# Patient Record
Sex: FEMALE | Race: WHITE | URBAN - METROPOLITAN AREA
[De-identification: names, ages, dates, MRNs, and addresses within clinical notes are randomized per-mention and may not be internally consistent; named-entity substitution may affect disease eponyms.]

---

## 2018-09-14 ENCOUNTER — HOSPITAL ENCOUNTER (OUTPATIENT)
Age: 28
Discharge: HOME | End: 2018-09-16
Payer: COMMERCIAL

## 2018-09-14 PROCEDURE — G0123 SCREEN CERV/VAG THIN LAYER: HCPCS

## 2019-11-26 RX ORDER — LEVONORGESTREL / ETHINYL ESTRADIOL AND ETHINYL ESTRADIOL 150-30(84)
KIT ORAL DAILY
COMMUNITY
End: 2022-07-14

## 2022-07-14 ENCOUNTER — OFFICE VISIT (OUTPATIENT)
Dept: BARIATRICS/WEIGHT MGMT | Age: 32
End: 2022-07-14
Payer: COMMERCIAL

## 2022-07-14 VITALS
HEART RATE: 77 BPM | DIASTOLIC BLOOD PRESSURE: 80 MMHG | SYSTOLIC BLOOD PRESSURE: 121 MMHG | TEMPERATURE: 98.2 F | HEIGHT: 67 IN | WEIGHT: 293 LBS | BODY MASS INDEX: 45.99 KG/M2

## 2022-07-14 DIAGNOSIS — M25.561 CHRONIC PAIN OF BOTH KNEES: Primary | ICD-10-CM

## 2022-07-14 DIAGNOSIS — G89.29 CHRONIC PAIN OF BOTH KNEES: Primary | ICD-10-CM

## 2022-07-14 DIAGNOSIS — E66.01 CLASS 3 SEVERE OBESITY DUE TO EXCESS CALORIES WITHOUT SERIOUS COMORBIDITY WITH BODY MASS INDEX (BMI) OF 45.0 TO 49.9 IN ADULT (HCC): ICD-10-CM

## 2022-07-14 DIAGNOSIS — M25.562 CHRONIC PAIN OF BOTH KNEES: Primary | ICD-10-CM

## 2022-07-14 PROCEDURE — G2212 PROLONG OUTPT/OFFICE VIS: HCPCS | Performed by: INTERNAL MEDICINE

## 2022-07-14 PROCEDURE — 1036F TOBACCO NON-USER: CPT | Performed by: INTERNAL MEDICINE

## 2022-07-14 PROCEDURE — 99202 OFFICE O/P NEW SF 15 MIN: CPT

## 2022-07-14 PROCEDURE — G8428 CUR MEDS NOT DOCUMENT: HCPCS | Performed by: INTERNAL MEDICINE

## 2022-07-14 PROCEDURE — 99205 OFFICE O/P NEW HI 60 MIN: CPT | Performed by: INTERNAL MEDICINE

## 2022-07-14 PROCEDURE — G8417 CALC BMI ABV UP PARAM F/U: HCPCS | Performed by: INTERNAL MEDICINE

## 2022-07-14 RX ORDER — ACETAMINOPHEN 160 MG
1 TABLET,DISINTEGRATING ORAL DAILY
COMMUNITY

## 2022-07-14 NOTE — PROGRESS NOTES
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  Lowest   wt 215 lbs  Highest  wt 340 lbs  Pattern of wt gain: grad  Wt change past yr: - 26 lbs  Most wt lost: 45 lbs [facility], [facility])  Other diets attempted: none    Desire to lose weight: 10/10  Problem posed by appetite: 5-6/10    Initial Diet:    Number of meals per day - 3    Number of snacks per day - 0-1    Meal volume - 12\" plate, sometimes seconds    Fast food/convenience store - 1-2x/week    Restaurants (not fast food) - 0-1x/week   Sweets - 1-2d/week (1 reg size candy bar or 2 cups of Halo top ice cream 290 osmani)   Chips - 0d/week   Crackers/pretzels - 1d/week (one to two handfuls of Cheezits)   Nuts - 2-3d/week (one handful)   Peanut Butter - 0d/week   Popcorn - 0-1d/week (100 osmani MW bag)   Dried fruit - 0d/week   Whole fruit - 3d/week (one piece)   Breakfast cereal - 0d/week   Granola/Protein/Energy bar - 0-2d/week (Aldi Endurance low carb bar or a Kind bar)   Sugar sweetened beverages - 1-2 cups of coffee with 2 Tablespoons of Coffee mate creamer/day   Protein - No supplements   Fiber - No supplements     Exercise:    Gym membership - [employer]    Walking - 30 min, 4x/wk    Running - none    Resistance - none    Aerobic class - none    ______________________    [facility] -  Past Medical History:   Diagnosis Date    Chronic pain of both knees     Class 3 severe obesity due to excess calories without serious comorbidity with body mass index (BMI) of 45.0 to 49.9 in adult [facility])     Infertility, female      Current Outpatient Medications   Medication Sig Dispense Refill    metFORMIN (GLUCOPHAGE) 500 MG tablet Take 500 mg by mouth 3 times daily      Cholecalciferol (VITAMIN D3) 50 MCG (2000 UT) CAPS Take 1 capsule by mouth daily      Prenatal Vit-Fe Fumarate-FA (PRENATAL VITAMINS PO) Take 1 tablet by mouth daily       No current facility-administered medications for this visit. ROS -  Card - no CP  GI - no N/V/D/C    PE -  Gen : /80 (Site: Left Upper Arm, Position: Sitting, Cuff Size: Thigh)   Pulse 77   Temp 98.2 °F (36.8 °C) (Temporal)   Ht 5' 6.5\" (1.689 m)   Wt (!) 313 lb 12.8 oz (142.3 kg)   LMP 07/01/2022 (Exact Date)   BMI 49.89 kg/m²    WN, WD, NAD  Heart:  RRR w/o MGR, no Carotid bruits  Lung: Nml resp effort, CTA b/l  Psych: Normal mood   Full affect  Neuro: Moves all ext well  ______________________    HISTORY & ASSESSMENT/PLAN -     Problem 1 - Knee pain   HPI -  Began in late 20's   No injury   Tylenol makes it better   Physical activity and excess weight worsens it   Severity this past week: 5/10   Assessment - Uncontrolled; excess wt is increasing the mechanical load on the joint and unfavorably altering the biomechanics of posture and gain   Plan -   Wt reduction per the plan below    Problem 2 - Obesity   HPI -  See above Background for description  Weight  Date   313.8 lbs 07/15/22  DEN = 2,270 osmani/d = 15,890 osmani/wk  Wt effect of HR foods = Restaurant food 300 osmani/wk + Sweets 375 + Nuts 310 + Endurance bar 240 + Coffee 315 = 1540 osmani/wk = 220 osmani/d = 10% DEN = 22 lbs/yr  Pt is preparing for IVF (male partner infertility). She must reduce her weight prior to the procedure. Her goal is 265 lbs. She does not want surgery and does not feel that she can tolerate a VLCD  Instead, she prefers a counting-based regimen (has had success with WW in the past) and is agreeable with targeted elimination/restriction. She does not want an appetite suppressant b/c of fearing the teratogenous effects if she were to unexpectedly conceive. We discussed exercise. She feels she can walk two miles every day. Initial diet plan: calorie-counting with rules of elimination and well-defined restrictions.   Initial appetite suppressant: none   Assessment - Uncontrolled    Plan -   Patient Instructions   Rules:  · Count every calorie every day  · Limit sweets to one day per month  · Limit chips/crackers/pretzels/nuts/popcorn to 100 osmani/day  · Eliminate all sugar sweetened beverages (including fruit juice) (keep total creamer for all coffee in a day to <40 calories)  · Limit restaurants (including fast food and food from a convenience store) to one time every two weeks while in town    Requirements:  · Make sure protein intake is at least 65 grams per day (do not count protein every day; instead spot check your intake every 2-3 weeks and make sure what you think you are getting is close to accurate; consider using a protein shake if needed; these are in the pharmacy section of the stores, not the grocery section; Premier, Pure Protein and Fairlife are relatively inexpensive and taste good to most patients; other options are Nectar, Boost Max, Ensure Max, BeneProtein and GNC lean (which is lactose-free); Nectar fruit, Premier Protein Clear, IsoPure Protein Drink, and Protein 2 O are water-based options; Quest (or Cosco, which is cheaper and is ordered on [address] [address]) and the Oh [employer] 1 protein bars can also be used, but have less protein in them )  (Disclaimer: Dietary supplements rarely have their listed ingredients and the amount of each verified by a third party other. Sometimes they give verification for their claims to be GMO and gluten free and to be organic. However, even such verifications as these may still be untrustworthy.)  · Make sure that fiber intake is at least 22 grams per day. Do this by either eating 12 tablespoons of the original, plain Fiber One cereal every day or 4 tablespoons of wheat dextrin powder (Benefiber or a generic brand) every day. Work up to this amount slowly by starting with only one-eighth to one-fourth of the target amount and then adding another one-eighth to one-fourth every one or two weeks until reaching the target.   · Take one multivitamin every day  · Drink at least 64 oz of water daily  · Sleep 7 hours each night    Targets:  · Limit calorie intake to 1500 calories/day  · Walk 40 minutes daily  · Avoid eating 2 hours within bedtime. Tips:  · Do not eat outside of the dining room or the kitchen  · Do not eat while watching TV, videos, working on the computer or using a smart phone  · Do not eat food out of a multi-serving bag or container. · Establish 6 hours of food-free \"time-out\" periods (times you don't eat) each day. No period can be less than 1 hour long. The periods need to be the same every day for days that are the same (for example, workdays would have one set of food free periods and weekends would have another set of days). These six hours are in addition to the two hours before bedtime and the time spent sleeping.     Return to see me in 4-6 weeks    Total time spent on encounter: 90 min    Rolo Avitia MD  Endocrinology/Obesity  7/14/22

## 2022-08-31 ENCOUNTER — OFFICE VISIT (OUTPATIENT)
Dept: BARIATRICS/WEIGHT MGMT | Age: 32
End: 2022-08-31
Payer: COMMERCIAL

## 2022-08-31 VITALS
DIASTOLIC BLOOD PRESSURE: 63 MMHG | SYSTOLIC BLOOD PRESSURE: 110 MMHG | TEMPERATURE: 98.1 F | BODY MASS INDEX: 45.99 KG/M2 | HEART RATE: 63 BPM | HEIGHT: 67 IN | WEIGHT: 293 LBS

## 2022-08-31 DIAGNOSIS — M25.562 CHRONIC PAIN OF BOTH KNEES: Primary | ICD-10-CM

## 2022-08-31 DIAGNOSIS — M25.561 CHRONIC PAIN OF BOTH KNEES: Primary | ICD-10-CM

## 2022-08-31 DIAGNOSIS — E66.01 CLASS 3 SEVERE OBESITY DUE TO EXCESS CALORIES WITHOUT SERIOUS COMORBIDITY WITH BODY MASS INDEX (BMI) OF 45.0 TO 49.9 IN ADULT (HCC): ICD-10-CM

## 2022-08-31 DIAGNOSIS — G89.29 CHRONIC PAIN OF BOTH KNEES: Primary | ICD-10-CM

## 2022-08-31 PROCEDURE — 99213 OFFICE O/P EST LOW 20 MIN: CPT | Performed by: INTERNAL MEDICINE

## 2022-08-31 PROCEDURE — G8417 CALC BMI ABV UP PARAM F/U: HCPCS | Performed by: INTERNAL MEDICINE

## 2022-08-31 PROCEDURE — 1036F TOBACCO NON-USER: CPT | Performed by: INTERNAL MEDICINE

## 2022-08-31 PROCEDURE — 99211 OFF/OP EST MAY X REQ PHY/QHP: CPT

## 2022-08-31 PROCEDURE — G8428 CUR MEDS NOT DOCUMENT: HCPCS | Performed by: INTERNAL MEDICINE

## 2022-08-31 NOTE — PATIENT INSTRUCTIONS
Rules:  Count every calorie every day  Limit sweets to one day per month  Limit chips/crackers/pretzels/nuts/popcorn to 100 osmani/day  Eliminate all sugar sweetened beverages (including fruit juice) (keep total creamer for all coffee in a day to <40 calories)  Limit restaurants (including fast food and food from a convenience store) to one time every two weeks while in town    Requirements:  Make sure protein intake is at least 65 grams per day (do not count protein every day; instead spot check your intake every 2-3 weeks and make sure what you think you are getting is close to accurate; consider using a protein shake if needed; these are in the pharmacy section of the stores, not the grocery section; Premier, Pure Protein and Fairlife are relatively inexpensive and taste good to most patients; other options are Nectar, Boost Max, Ensure Max, BeneProtein and GNC lean (which is lactose-free); Nectar fruit, Premier Protein Clear, IsoPure Protein Drink, and Protein 2 O are water-based options; Quest (or Cosco, which is cheaper and is ordered on [employer]) and the [employer] protein bars can also be used, but have less protein in them )  (Disclaimer: Dietary supplements rarely have their listed ingredients and the amount of each verified by a third party other. Sometimes they give verification for their claims to be GMO and gluten free and to be organic. However, even such verifications as these may still be untrustworthy.)  Make sure that fiber intake is at least 22 grams per day. Do this by either eating 12 tablespoons of the original, plain Fiber One cereal every day or 4 tablespoons of wheat dextrin powder (Benefiber or a generic brand) every day. Work up to this amount slowly by starting with only one-eighth to one-fourth of the target amount and then adding another one-eighth to one-fourth every one or two weeks until reaching the target.   Take one multivitamin every day  Drink at least 64 oz of water daily  Sleep 7 hours each night    Targets:  Limit calorie intake to 1500 calories/day  Walk 40 minutes daily  Avoid eating 2 hours within bedtime. Tips:  Do not eat outside of the dining room or the kitchen  Do not eat while watching TV, videos, working on the computer or using a smart phone  Do not eat food out of a multi-serving bag or container. Establish 6 hours of food-free \"time-out\" periods (times you don't eat) each day. No period can be less than 1 hour long. The periods need to be the same every day for days that are the same (for example, workdays would have one set of food free periods and weekends would have another set of days). These six hours are in addition to the two hours before bedtime and the time spent sleeping.     Return to see me in 4-6 weeks

## 2022-08-31 NOTE — PROGRESS NOTES
CC -   Chronic knee pain, Obesity    BACKGROUND -   Last visit: 07/14/22  First visit: 07/14/22    Obesity   Began in teens  Initial BMI 49.9, Wt 313.8 lbs, Ht 5' 6.5\"  HS Grad wt 230 lbs  Lowest   wt 215 lbs  Highest  wt 340 lbs  Pattern of wt gain: grad  Wt change past yr: - 26 lbs  Most wt lost: 45 lbs [facility])  Other diets attempted: none    Desire to lose weight: 10/10  Problem posed by appetite: 5-6/10    Initial Diet:    Number of meals per day - 3    Number of snacks per day - 0-1    Meal volume - 12\" plate, sometimes seconds    Fast food/convenience store - 1-2x/week    Restaurants (not fast food) - 0-1x/week   Sweets - 1-2d/week (1 reg size candy bar or 2 cups of Halo top ice cream 290 osmani)   Chips - 0d/week   Crackers/pretzels - 1d/week (one to two handfuls of Cheezits)   Nuts - 2-3d/week (one handful)   Peanut Butter - 0d/week   Popcorn - 0-1d/week (100 osmani MW bag)   Dried fruit - 0d/week   Whole fruit - 3d/week (one piece)   Breakfast cereal - 0d/week   Granola/Protein/Energy bar - 0-2d/week (Aldi Endurance low carb bar or a Kind bar)   Sugar sweetened beverages - 1-2 cups of coffee with 2 Tablespoons of Coffee mate creamer/day   Protein - No supplements   Fiber - No supplements     Exercise:    Gym membership - [employer]    Walking - 30 min, 4x/wk    Running - none    Resistance - none    Aerobic class - none    ______________________    [facility] -  Past Medical History:   Diagnosis Date    Chronic pain of both knees     Class 3 severe obesity due to excess calories without serious comorbidity with body mass index (BMI) of 45.0 to 49.9 in adult [facility])     Infertility, female      Current Outpatient Medications   Medication Sig Dispense Refill    metFORMIN (GLUCOPHAGE) 500 MG tablet Take 500 mg by mouth 3 times daily      Cholecalciferol (VITAMIN D3) 50 MCG (2000 UT) CAPS Take 1 capsule by mouth daily      Prenatal Vit-Fe Fumarate-FA (PRENATAL VITAMINS PO) Take 1 tablet by mouth daily       No current facility-administered medications for this visit. ROS -  Card - no CP  GI - no N/V/D/C    PE -  Gen : /63 (Site: Left Upper Arm, Position: Sitting, Cuff Size: Thigh)   Pulse 63   Temp 98.1 °F (36.7 °C) (Temporal)   Ht 5' 6.5\" (1.689 m)   Wt 299 lb 12.8 oz (136 kg)   LMP 08/17/2022 (Exact Date)   BMI 47.66 kg/m²    WN, WD, NAD  Lung: Nml resp effort  Psych: Normal mood   Full affect  Neuro: Moves all ext well  ______________________    HISTORY & ASSESSMENT/PLAN -     Problem 1 - Knee pain   HPI -  Began in late 20's   B/L, R>L   No injury   Tylenol makes it better   Physical activity and excess weight worsens it   Severity this past week: 5-6/10 (slightly worse b/c of increased activity)  Assessment - Uncontrolled; excess wt is increasing the mechanical load on the joint and unfavorably altering the biomechanics of posture and gain  Plan -   Wt reduction per the plan below    Problem 2 - Obesity   HPI -  See above Background for description  Weight  Date   313.8 lbs 07/15/22   299.8 lbs 08/31/22  Total wt loss to date: - 14.0 lbs  DEN = 2,270 osmani/d = 15,890 osmani/wk  Avg daily energy variance:   07/15/22-08/31/22 = - 12.4 lbs/56d = - 775 osmani/d deficit   Wt effect of HR foods = Restaurant food 300 osmain/wk + Sweets 375 + Nuts 310 + Endurance bar 240 + Coffee 315 = 1540 osmain/wk = 220 osmani/d = 10% DEN = 22 lbs/yr  Pt is preparing for IVF (male partner infertility). She must reduce her weight prior to the procedure. Her goal is 265 lbs. She does not want surgery and does not feel that she can tolerate a VLCD  Instead, she prefers a counting-based regimen (has had success with WW in the past) and is agreeable with targeted elimination/restriction. She does not want an appetite suppressant b/c of fearing the teratogenous effects if she were to unexpectedly conceive. We discussed exercise. She feels she can walk two miles every day.   Initial diet plan: calorie-counting with rules of elimination and well-defined restrictions. Initial appetite suppressant: none                                  Update:  Calorie monitoring: counting 4-5d/wk, usual intake is 2600-9126 osmani/d  Sweets: 2d/mo (two fun-size candy bars each day)  Salty snack - 1d/wk, 100 osmani  CCB - none, coffee creamer <40 calories  Restaurant food - 1x/2wk  Assessment - Improved; cont with same plan  Plan -   Patient Instructions   Rules:  Count every calorie every day  Limit sweets to one day per month  Limit chips/crackers/pretzels/nuts/popcorn to 100 osmani/day  Eliminate all sugar sweetened beverages (including fruit juice) (keep total creamer for all coffee in a day to <40 calories)  Limit restaurants (including fast food and food from a convenience store) to one time every two weeks while in town    Requirements:  Make sure protein intake is at least 65 grams per day (do not count protein every day; instead spot check your intake every 2-3 weeks and make sure what you think you are getting is close to accurate; consider using a protein shake if needed; these are in the pharmacy section of the stores, not the grocery section; Premier, Pure Protein and Fairlife are relatively inexpensive and taste good to most patients; other options are Nectar, Boost Max, Ensure Max, BeneProtein and GNC lean (which is lactose-free); Nectar fruit, Premier Protein Clear, IsoPure Protein Drink, and Protein 2 O are water-based options; Quest (or Cosco, which is cheaper and is ordered on [employer]) and the Oh [employer] 1 protein bars can also be used, but have less protein in them )  (Disclaimer: Dietary supplements rarely have their listed ingredients and the amount of each verified by a third party other. Sometimes they give verification for their claims to be GMO and gluten free and to be organic. However, even such verifications as these may still be untrustworthy.)  Make sure that fiber intake is at least 22 grams per day.  Do this by either eating 12 tablespoons of the original, plain Fiber One cereal every day or 4 tablespoons of wheat dextrin powder (Benefiber or a generic brand) every day. Work up to this amount slowly by starting with only one-eighth to one-fourth of the target amount and then adding another one-eighth to one-fourth every one or two weeks until reaching the target. Take one multivitamin every day  Drink at least 64 oz of water daily  Sleep 7 hours each night    Targets:  Limit calorie intake to 1500 calories/day  Walk 40 minutes daily  Avoid eating 2 hours within bedtime. Tips:  Do not eat outside of the dining room or the kitchen  Do not eat while watching TV, videos, working on the computer or using a smart phone  Do not eat food out of a multi-serving bag or container. Establish 6 hours of food-free \"time-out\" periods (times you don't eat) each day. No period can be less than 1 hour long. The periods need to be the same every day for days that are the same (for example, workdays would have one set of food free periods and weekends would have another set of days). These six hours are in addition to the two hours before bedtime and the time spent sleeping.     Return to see me in 4-6 weeks      Ramses Coronel MD  Endocrinology/Obesity  8/31/22

## 2022-12-12 ENCOUNTER — TELEPHONE (OUTPATIENT)
Dept: BARIATRICS/WEIGHT MGMT | Age: 32
End: 2022-12-12

## 2024-02-01 ENCOUNTER — INITIAL PRENATAL (OUTPATIENT)
Dept: OBGYN CLINIC | Age: 34
End: 2024-02-01
Payer: COMMERCIAL

## 2024-02-01 ENCOUNTER — ANCILLARY PROCEDURE (OUTPATIENT)
Dept: OBGYN CLINIC | Age: 34
End: 2024-02-01
Payer: COMMERCIAL

## 2024-02-01 VITALS
DIASTOLIC BLOOD PRESSURE: 74 MMHG | HEART RATE: 65 BPM | BODY MASS INDEX: 45.92 KG/M2 | WEIGHT: 288.8 LBS | SYSTOLIC BLOOD PRESSURE: 120 MMHG

## 2024-02-01 DIAGNOSIS — Z3A.17 17 WEEKS GESTATION OF PREGNANCY: Primary | ICD-10-CM

## 2024-02-01 DIAGNOSIS — Z87.42 HISTORY OF INFERTILITY: ICD-10-CM

## 2024-02-01 DIAGNOSIS — O21.9 NAUSEA/VOMITING IN PREGNANCY: ICD-10-CM

## 2024-02-01 DIAGNOSIS — R39.9 UNSPECIFIED SYMPTOMS AND SIGNS INVOLVING THE GENITOURINARY SYSTEM: ICD-10-CM

## 2024-02-01 DIAGNOSIS — O09.812 PREGNANCY RESULTING FROM IN VITRO FERTILIZATION IN SECOND TRIMESTER: ICD-10-CM

## 2024-02-01 LAB
GLUCOSE URINE, POC: NEGATIVE
PROTEIN UA: NEGATIVE

## 2024-02-01 PROCEDURE — 81002 URINALYSIS NONAUTO W/O SCOPE: CPT | Performed by: OBSTETRICS & GYNECOLOGY

## 2024-02-01 PROCEDURE — 76817 TRANSVAGINAL US OBSTETRIC: CPT | Performed by: OBSTETRICS & GYNECOLOGY

## 2024-02-01 PROCEDURE — 99203 OFFICE O/P NEW LOW 30 MIN: CPT | Performed by: OBSTETRICS & GYNECOLOGY

## 2024-02-01 PROCEDURE — 76805 OB US >/= 14 WKS SNGL FETUS: CPT | Performed by: OBSTETRICS & GYNECOLOGY

## 2024-02-01 RX ORDER — ASPIRIN 81 MG/1
81 TABLET ORAL DAILY
Qty: 90 TABLET | Refills: 1 | Status: SHIPPED | OUTPATIENT
Start: 2024-02-01

## 2024-02-01 RX ORDER — ONDANSETRON 4 MG/1
4 TABLET, FILM COATED ORAL 4 TIMES DAILY
COMMUNITY
Start: 2023-12-08

## 2024-02-01 RX ORDER — ESTRADIOL 2 MG/1
2 TABLET ORAL 3 TIMES DAILY
COMMUNITY
Start: 2023-10-30

## 2024-02-01 NOTE — PROGRESS NOTES
Pt here as a new patient.  Pt has some mild cramping. Denies bleeding/lof.    
your office for ongoing obstetric care.    --The total time spent on today's visit was 50 minutes.  This included preparation for the visit (i.e. reviewing prior external notes and test results), performance of a medically appropriate history and examination, counseling, orders for medications, tests or other procedures, and coordination of care.  Greater than 50% of the time was spent face-to-face with the patient.  This time is exclusive of procedures performed.       --At the conclusion of the visit, the patient appeared to have a good understanding of the issues discussed.  I answered all of her questions to her satisfaction. I asked her to call if she had any additional questions prior to her next visit.      --Thank you for allowing me to participate in the care of this pleasant patient.  Please don't hesitate to call me if you have any questions.      Sincerely,        Kindra Baig MD, FACOG    Maternal-Fetal Medicine  [facility]  [phone] option 1   [address] [place] [address], [address] [place] [address]    *All or parts of this note may have been generated using a voice recognition program. There may be typo, grammar, or Word substitution errors that have escaped my review of this note.

## 2024-02-01 NOTE — PATIENT INSTRUCTIONS
Please arrive for your scheduled appointment at least 15 minutes early with your actual insurance card+ a photo ID. Also if you need any refills ordered or have questions, it may take up 48 hours to reply. Please allow ample time for your refills. Call me when you use last refill. Thank you for your cooperation. You might be having an NST at your next appt. Please eat a large snack or breakfast before coming to office. Thank youCall your primary obstetrician with bleeding, leaking of fluid, abdominal tenderness, headache, blurry vision, epigastric pain and increased urinary frequency.If you are experiencing an emergency and need immediate help, call 911 or go to go emergency room or labor and delivery. Do kick counts after dinner. Call your primary obstetrician if less than 10 kicks in 2 hours after dinner.     Call your primary obstetrician with bleeding, leaking of fluid, abdominal tenderness, headache, blurry vision, epigastric pain and increased urinary frequency.Do kick counts after dinner. Call your primary obstetrician if less than 10 kicks in 2 hours after dinner.     Call your primary obstetrician with bleeding, leaking of fluid, abdominal tenderness, headache, blurry vision, epigastric pain and increased urinary frequency.

## 2024-02-09 PROBLEM — Z87.42 HISTORY OF INFERTILITY: Status: ACTIVE | Noted: 2024-02-09

## 2024-02-09 PROBLEM — O09.812 PREGNANCY RESULTING FROM IN VITRO FERTILIZATION IN SECOND TRIMESTER: Status: ACTIVE | Noted: 2024-02-09

## 2024-02-09 PROBLEM — O21.9 NAUSEA/VOMITING IN PREGNANCY: Status: ACTIVE | Noted: 2024-02-09

## 2024-02-09 PROBLEM — R39.9 UNSPECIFIED SYMPTOMS AND SIGNS INVOLVING THE GENITOURINARY SYSTEM: Status: ACTIVE | Noted: 2024-02-09

## 2024-02-22 ENCOUNTER — ANCILLARY PROCEDURE (OUTPATIENT)
Dept: OBGYN CLINIC | Age: 34
End: 2024-02-22
Payer: COMMERCIAL

## 2024-02-22 ENCOUNTER — ROUTINE PRENATAL (OUTPATIENT)
Dept: OBGYN CLINIC | Age: 34
End: 2024-02-22
Payer: COMMERCIAL

## 2024-02-22 VITALS
WEIGHT: 288 LBS | SYSTOLIC BLOOD PRESSURE: 120 MMHG | BODY MASS INDEX: 45.79 KG/M2 | DIASTOLIC BLOOD PRESSURE: 64 MMHG | HEART RATE: 72 BPM

## 2024-02-22 DIAGNOSIS — Z3A.20 20 WEEKS GESTATION OF PREGNANCY: Primary | ICD-10-CM

## 2024-02-22 DIAGNOSIS — Z87.42 HISTORY OF INFERTILITY: ICD-10-CM

## 2024-02-22 DIAGNOSIS — O09.812 PREGNANCY RESULTING FROM IN VITRO FERTILIZATION IN SECOND TRIMESTER: ICD-10-CM

## 2024-02-22 DIAGNOSIS — O44.40 LOW-LYING PLACENTA: ICD-10-CM

## 2024-02-22 PROCEDURE — 99213 OFFICE O/P EST LOW 20 MIN: CPT | Performed by: OBSTETRICS & GYNECOLOGY

## 2024-02-22 PROCEDURE — 76811 OB US DETAILED SNGL FETUS: CPT | Performed by: OBSTETRICS & GYNECOLOGY

## 2024-02-22 PROCEDURE — 81002 URINALYSIS NONAUTO W/O SCOPE: CPT | Performed by: OBSTETRICS & GYNECOLOGY

## 2024-02-22 PROCEDURE — 76817 TRANSVAGINAL US OBSTETRIC: CPT | Performed by: OBSTETRICS & GYNECOLOGY

## 2024-02-22 PROCEDURE — 99214 OFFICE O/P EST MOD 30 MIN: CPT | Performed by: OBSTETRICS & GYNECOLOGY

## 2024-02-22 NOTE — PROGRESS NOTES
Patient is here today for 3 wk f/u. Denies any vaginal bleeding, cramping, or leakage of fluids.  Slight movement.   
reviewed.  Testing is recommended at 15 to 22 weeks gestation.  --The patient reports that she had testing done locally.     2.  IVF Pregnancy -- The patient reported this pregnancy was conceived via in vitro fertilization  and intracytoplasmic sperm injection (ICSI).  She had a frozen embryo transfer.  Per the patient, preimplantation genetic testing was completed and normal.      Counseling was previously provided.  Counseling was reviewed.  She was counseled that pregnancies conceived using in vitro fertilization may be at increased risk for obstetric complications such as poor fetal growth,  delivery, hypertensive disorders of pregnancy, and/or congenital anomalies (heart).  Because of these increased risks, increased surveillance is recommended.      A fetal echocardiogram is recommended at 22-24 weeks gestation.      --A referral was provided.     Fetal growth should be monitored serially, every 3-4 weeks beginning a 24-26 weeks' gestation.   --Fetal growth was appropriate for the gestational age at 20 weeks 0 days.    Fetal kick counts should be monitored daily starting at 28 weeks' gestation.  Twice-weekly fetal testing is recommended starting at 32 weeks gestation.  Delivery is recommended at/by 39 weeks gestation.     3.  History of infertility -- The patient reported a history of infertility with prior evaluation by a specialist.  This pregnancy was conceived using IVF.  The patient reports that there was a male factor with low sperm counts.  She also has a history of PCOS.     Counseling was previously provided to the patient.  Counseling was reviewed.     Women who use infertility therapies (in vitro fertilization [IVF] or non-IVF) appear to have a small but statistically significant increase in risk of some pregnancy complications, such as  birth. Compared to the general population, an increased risk of  birth, hypertensive disorders of pregnancy, and low birth weight has also been

## 2024-03-14 ENCOUNTER — ANCILLARY PROCEDURE (OUTPATIENT)
Dept: OBGYN CLINIC | Age: 34
End: 2024-03-14
Payer: COMMERCIAL

## 2024-03-14 ENCOUNTER — ROUTINE PRENATAL (OUTPATIENT)
Dept: OBGYN CLINIC | Age: 34
End: 2024-03-14
Payer: COMMERCIAL

## 2024-03-14 VITALS
HEART RATE: 69 BPM | DIASTOLIC BLOOD PRESSURE: 79 MMHG | BODY MASS INDEX: 46.58 KG/M2 | WEIGHT: 293 LBS | SYSTOLIC BLOOD PRESSURE: 122 MMHG

## 2024-03-14 DIAGNOSIS — O44.40 LOW-LYING PLACENTA: ICD-10-CM

## 2024-03-14 DIAGNOSIS — O09.812 PREGNANCY RESULTING FROM IN VITRO FERTILIZATION IN SECOND TRIMESTER: ICD-10-CM

## 2024-03-14 DIAGNOSIS — R79.89 LOW VITAMIN B12 LEVEL: ICD-10-CM

## 2024-03-14 DIAGNOSIS — Z3A.23 23 WEEKS GESTATION OF PREGNANCY: Primary | ICD-10-CM

## 2024-03-14 DIAGNOSIS — Z87.42 HISTORY OF INFERTILITY: ICD-10-CM

## 2024-03-14 LAB
GLUCOSE URINE, POC: NORMAL
PROTEIN UA: NEGATIVE

## 2024-03-14 PROCEDURE — 99213 OFFICE O/P EST LOW 20 MIN: CPT | Performed by: OBSTETRICS & GYNECOLOGY

## 2024-03-14 PROCEDURE — 76817 TRANSVAGINAL US OBSTETRIC: CPT | Performed by: OBSTETRICS & GYNECOLOGY

## 2024-03-14 PROCEDURE — 76816 OB US FOLLOW-UP PER FETUS: CPT | Performed by: OBSTETRICS & GYNECOLOGY

## 2024-03-14 PROCEDURE — 81002 URINALYSIS NONAUTO W/O SCOPE: CPT | Performed by: OBSTETRICS & GYNECOLOGY

## 2024-03-14 RX ORDER — LANOLIN ALCOHOL/MO/W.PET/CERES
1000 CREAM (GRAM) TOPICAL DAILY
Qty: 30 TABLET | Refills: 3 | Status: SHIPPED | OUTPATIENT
Start: 2024-03-14

## 2024-03-14 NOTE — PROGRESS NOTES
Patient is here today for 3 wk f/u. Denies any vaginal bleeding, cramping, or leakage of fluids.  Patient reports good fetal movement.     
her risk for hypertensive disorders of pregnancy was reviewed.  The risks and benefits of low dose aspirin were discussed.  She was counseled that she could take 81 mg of aspirin, daily.  A prescription was previously provided.  The patient should continue a daily low-dose aspirin for the remainder of pregnancy and 6 to 8 weeks postpartum.     Additional maternal evaluation was recommended with a nutrition panel, thyroid function studies, and a hemoglobin A1c.  -- Baseline testing completed on 2024.  Results are above.  Additional recommendations are below.        6.   Nausea and vomiting of pregnancy, improved - The patients previously reported having persistent nausea.  She reports occasional emesis.  She reported that she has been losing weight.  The patient reported daily symptoms of nausea and decreased appetite.  She denied any signs of symptoms of dehydration.  Precautions were reviewed.       Today, the patient again reports that her symptoms of nausea and vomiting is significantly improved.  She has not required Zofran.     The patient was again encouraged to eat small amounts of food every 2-3 hours during the day.  She was also encouraged to stay well-hydrated.     A baseline nutrition panel (CBC, CMP, magnesium, ferritin, folate, vitamin B12, vitamin D 25 OH) was recommended.   --Testing was completed on 2024.  Results are above.  Recommendations are below.     7. Polycystic ovarian syndrome -- The patient reported a history of PCOS.  Counseling was provided.  The patient was counseled regarding increased risks of polycystic ovarian syndrome in pregnancy including early recurrent pregnancy loss, gestational diabetes,  birth, and hypertensive disorders of pregnancy.       A screening cervical length was completed at 20 weeks 0 days and noted to be normal at 3.7 cm.   -- Transvaginal cervical length 23 weeks 0 days, 3.6 cm without funneling    Continued cervical length monitoring is

## 2024-03-21 PROBLEM — R79.89 LOW VITAMIN B12 LEVEL: Status: ACTIVE | Noted: 2024-03-21

## 2024-04-04 ENCOUNTER — ANCILLARY PROCEDURE (OUTPATIENT)
Dept: OBGYN CLINIC | Age: 34
End: 2024-04-04
Payer: COMMERCIAL

## 2024-04-04 ENCOUNTER — ROUTINE PRENATAL (OUTPATIENT)
Dept: OBGYN CLINIC | Age: 34
End: 2024-04-04
Payer: COMMERCIAL

## 2024-04-04 VITALS
SYSTOLIC BLOOD PRESSURE: 112 MMHG | HEART RATE: 88 BPM | HEIGHT: 67 IN | BODY MASS INDEX: 45.99 KG/M2 | WEIGHT: 293 LBS | DIASTOLIC BLOOD PRESSURE: 71 MMHG

## 2024-04-04 DIAGNOSIS — O09.812 PREGNANCY RESULTING FROM IN VITRO FERTILIZATION IN SECOND TRIMESTER: ICD-10-CM

## 2024-04-04 DIAGNOSIS — O44.40 LOW-LYING PLACENTA: ICD-10-CM

## 2024-04-04 DIAGNOSIS — R79.89 LOW VITAMIN B12 LEVEL: ICD-10-CM

## 2024-04-04 DIAGNOSIS — O26.879 SHORT CERVIX AFFECTING PREGNANCY: ICD-10-CM

## 2024-04-04 DIAGNOSIS — Z3A.26 26 WEEKS GESTATION OF PREGNANCY: Primary | ICD-10-CM

## 2024-04-04 LAB
C TRACH DNA GENITAL QL NAA+PROBE: NORMAL
MISCELLANEOUS LAB TEST RESULT: NORMAL
N. GONORRHOEAE DNA: NORMAL
TEST NAME: NORMAL

## 2024-04-04 PROCEDURE — 81002 URINALYSIS NONAUTO W/O SCOPE: CPT | Performed by: OBSTETRICS & GYNECOLOGY

## 2024-04-04 PROCEDURE — 76817 TRANSVAGINAL US OBSTETRIC: CPT | Performed by: OBSTETRICS & GYNECOLOGY

## 2024-04-04 PROCEDURE — 76816 OB US FOLLOW-UP PER FETUS: CPT | Performed by: OBSTETRICS & GYNECOLOGY

## 2024-04-04 PROCEDURE — 99213 OFFICE O/P EST LOW 20 MIN: CPT | Performed by: OBSTETRICS & GYNECOLOGY

## 2024-04-04 RX ORDER — PROGESTERONE 200 MG/1
CAPSULE ORAL
Qty: 30 CAPSULE | Refills: 2 | Status: SHIPPED | OUTPATIENT
Start: 2024-04-04

## 2024-04-07 LAB
CULTURE: ABNORMAL
SPECIMEN DESCRIPTION: ABNORMAL

## 2024-04-08 LAB
C TRACH DNA GENITAL QL NAA+PROBE: NEGATIVE
CULTURE: ABNORMAL
GLUCOSE URINE, POC: NORMAL
MISCELLANEOUS LAB TEST RESULT: ABNORMAL
N. GONORRHOEAE DNA: NEGATIVE
PROTEIN UA: NEGATIVE
SPECIMEN DESCRIPTION: ABNORMAL
TEST NAME: ABNORMAL

## 2024-04-10 ENCOUNTER — ROUTINE PRENATAL (OUTPATIENT)
Dept: OBGYN CLINIC | Age: 34
End: 2024-04-10
Payer: COMMERCIAL

## 2024-04-10 ENCOUNTER — ANCILLARY PROCEDURE (OUTPATIENT)
Dept: OBGYN CLINIC | Age: 34
End: 2024-04-10
Payer: COMMERCIAL

## 2024-04-10 VITALS
SYSTOLIC BLOOD PRESSURE: 119 MMHG | BODY MASS INDEX: 46.89 KG/M2 | HEART RATE: 78 BPM | DIASTOLIC BLOOD PRESSURE: 76 MMHG | WEIGHT: 293 LBS

## 2024-04-10 DIAGNOSIS — O09.812 PREGNANCY RESULTING FROM IN VITRO FERTILIZATION IN SECOND TRIMESTER: ICD-10-CM

## 2024-04-10 DIAGNOSIS — O26.879 SHORT CERVIX AFFECTING PREGNANCY: ICD-10-CM

## 2024-04-10 DIAGNOSIS — Z3A.26 26 WEEKS GESTATION OF PREGNANCY: Primary | ICD-10-CM

## 2024-04-10 LAB
GLUCOSE URINE, POC: NEGATIVE
PROTEIN UA: NEGATIVE

## 2024-04-10 PROCEDURE — 99212 OFFICE O/P EST SF 10 MIN: CPT | Performed by: OBSTETRICS & GYNECOLOGY

## 2024-04-10 PROCEDURE — 76817 TRANSVAGINAL US OBSTETRIC: CPT | Performed by: OBSTETRICS & GYNECOLOGY

## 2024-04-10 PROCEDURE — 81002 URINALYSIS NONAUTO W/O SCOPE: CPT | Performed by: OBSTETRICS & GYNECOLOGY

## 2024-04-10 PROCEDURE — 76815 OB US LIMITED FETUS(S): CPT | Performed by: OBSTETRICS & GYNECOLOGY

## 2024-04-10 PROCEDURE — 99213 OFFICE O/P EST LOW 20 MIN: CPT | Performed by: OBSTETRICS & GYNECOLOGY

## 2024-04-10 NOTE — PROGRESS NOTES
Davina Kitchen presents to office today for US.  Patient denies bleeding, LOF, or cramping.   Positive fetal movement.

## 2024-04-10 NOTE — PROGRESS NOTES
April 10, 2024      Zev Cadena MD  [address] [place] [address]     RE:  CARLOS KITCHEN  : 1990   AGE: 33 y.o.    This report has been created using voice recognition software. It may contain errors which are inherent in voice recognition technology.    Dear Dr. Cadena:      I had the pleasure of meeting with Ms. Kitchen for a return consultation.  As you know, Ms. Kitchen  is a 33 y.o.  at 26w6d (IVF) who is being followed by our office for multiple medical issues.      Today, Ms. Kitchen reports that she feels well.  She notes good fetal movement and denies any symptoms of leaking of fluid, vaginal bleeding, and/or contractions.  She had a fetal ultrasound that was notable for the following.  There is a single intrauterine gestation in a cephalic presentation with a heart rate of 150 beats per minute.  The placenta is posterior.  The amniotic fluid index is 13.1 cm.  Transvaginal cervical length 2.9-3.1 cm without funneling.      PERTINENT PHYSICAL EXAMINATION:   /76   Pulse 78   Wt 135.8 kg (299 lb 6.4 oz)   BMI 46.89 kg/m²     Urine dipstick:   Negative for Glucose    Negative for Albumin      A fetal ultrasound assessment was performed today. A report is enclosed for your review.    Assessment & Plan:  33 y.o.  at 26w6d (IVF) with:    1.   Genetic counseling -- The patient was previously counseled regarding her options for genetic screening and/or diagnostic testing.  Counseling was reviewed.     The patient had that preimplantation genetic testing with her IVF.     The patient's prenatal record was reviewed.  She completed a expanded carrier panel.  She was noted to be a carrier for cystic fibrosis and MCAD.  Her partner was not a carrier for these conditions.  See below.     The patient was also counseled regarding the recommendation for maternal serum AFP to screen for open neural tube defects.  Risks and benefits of screening were reviewed.

## 2024-04-10 NOTE — PATIENT INSTRUCTIONS
changes in your health, and be sure to contact your doctor if:  You have vaginal discharge that smells bad.  You feel sad, anxious, or hopeless for more than a few days.  You have skin changes, such as a rash, itching, or a yellow color to your skin.  You have other concerns about your pregnancy.  If you have labor signs at 37 weeks or more  If you have signs of labor at 37 weeks or more, your doctor may tell you to call when your labor becomes more active. Symptoms of active labor include:  Contractions that are regular.  Contractions that are less than 5 minutes apart.  Contractions that are hard to talk through.  Follow-up care is a key part of your treatment and safety. Be sure to make and go to all appointments, and call your doctor if you are having problems. It's also a good idea to know your test results and keep a list of the medicines you take.  Where can you learn more?  Go to https://www.Setup.net/patientEd and enter N531 to learn more about \"Learning About When to Call Your Doctor During Pregnancy (After 20 Weeks).\"  Current as of: July 10, 2023               Content Version: 14.0  © 5807-4784 [employer].   Care instructions adapted under license by [employer]. If you have questions about a medical condition or this instruction, always ask your healthcare professional. [employer] disclaims any warranty or liability for your use of this information.

## 2024-04-11 PROBLEM — O26.879 SHORT CERVIX AFFECTING PREGNANCY: Status: ACTIVE | Noted: 2024-04-11

## 2024-04-12 ENCOUNTER — TELEPHONE (OUTPATIENT)
Dept: OBGYN CLINIC | Age: 34
End: 2024-04-12

## 2024-04-12 DIAGNOSIS — B37.9 YEAST INFECTION: ICD-10-CM

## 2024-04-12 DIAGNOSIS — N76.0 VAGINAL INFECTION: Primary | ICD-10-CM

## 2024-04-12 RX ORDER — FLUCONAZOLE 150 MG/1
150 TABLET ORAL ONCE
Qty: 1 TABLET | Refills: 0 | Status: SHIPPED | OUTPATIENT
Start: 2024-04-12 | End: 2024-04-12

## 2024-04-12 RX ORDER — AZITHROMYCIN 500 MG/1
1000 TABLET, FILM COATED ORAL ONCE
Qty: 5 TABLET | Refills: 0 | Status: SHIPPED | OUTPATIENT
Start: 2024-04-12 | End: 2024-04-12

## 2024-04-12 NOTE — TELEPHONE ENCOUNTER
Pt returned call.  Informed pt of her vaginal swab results and that Dr. Baig has ordered her Dilflucan and azithromycin.  Verified her pharmacy.  Pt verbalized understanding.

## 2024-04-12 NOTE — PROGRESS NOTES
[id number] toco only applied no uterine activity noted  
Davina Kitchen presents to office today for ultrasound.   Patient denies bleeding, LOF, or cramping.   Positive fetal movement.     
FACOG [facility] M[facility]  [phone] option 1   [address] [place] [address], [address] [place] [address]      *All or parts of this note may have been generated using a voice recognition program. There may be typo, grammar, or Word substitution errors that have escaped my review of this note.

## 2024-04-12 NOTE — TELEPHONE ENCOUNTER
Attempted to contact patient to inform patient of her vaginal swab results and let her know that Dr. Baig is ordering azithromycin and diflucan for her, however she did not answer the call.  Left voicemail asking for a return call.

## 2024-04-17 ENCOUNTER — ROUTINE PRENATAL (OUTPATIENT)
Dept: OBGYN CLINIC | Age: 34
End: 2024-04-17
Payer: COMMERCIAL

## 2024-04-17 ENCOUNTER — ANCILLARY PROCEDURE (OUTPATIENT)
Dept: OBGYN CLINIC | Age: 34
End: 2024-04-17
Payer: COMMERCIAL

## 2024-04-17 VITALS
DIASTOLIC BLOOD PRESSURE: 76 MMHG | HEART RATE: 82 BPM | BODY MASS INDEX: 46.73 KG/M2 | SYSTOLIC BLOOD PRESSURE: 128 MMHG | WEIGHT: 293 LBS

## 2024-04-17 DIAGNOSIS — R80.9 URINE PROTEIN INCREASED: ICD-10-CM

## 2024-04-17 DIAGNOSIS — R82.90 ABNORMAL URINALYSIS: ICD-10-CM

## 2024-04-17 DIAGNOSIS — O21.9 NAUSEA/VOMITING IN PREGNANCY: ICD-10-CM

## 2024-04-17 DIAGNOSIS — R79.89 ELEVATED LFTS: ICD-10-CM

## 2024-04-17 DIAGNOSIS — Z34.90 PREGNANCY, UNSPECIFIED GESTATIONAL AGE: Primary | ICD-10-CM

## 2024-04-17 DIAGNOSIS — E63.9 NUTRITIONAL DEFICIENCY: ICD-10-CM

## 2024-04-17 DIAGNOSIS — R79.89 LOW VITAMIN B12 LEVEL: ICD-10-CM

## 2024-04-17 DIAGNOSIS — O26.879 SHORT CERVIX AFFECTING PREGNANCY: ICD-10-CM

## 2024-04-17 LAB
GLUCOSE URINE, POC: NEGATIVE
PROTEIN UA: POSITIVE

## 2024-04-17 PROCEDURE — 76815 OB US LIMITED FETUS(S): CPT | Performed by: OBSTETRICS & GYNECOLOGY

## 2024-04-17 PROCEDURE — 99213 OFFICE O/P EST LOW 20 MIN: CPT | Performed by: OBSTETRICS & GYNECOLOGY

## 2024-04-17 PROCEDURE — 81002 URINALYSIS NONAUTO W/O SCOPE: CPT | Performed by: OBSTETRICS & GYNECOLOGY

## 2024-04-17 PROCEDURE — 76817 TRANSVAGINAL US OBSTETRIC: CPT | Performed by: OBSTETRICS & GYNECOLOGY

## 2024-04-17 NOTE — PROGRESS NOTES
2024         RE:  CARLOS KITCHEN  : 1990   AGE: 33 y.o.    This report has been created using voice recognition software. It may contain errors which are inherent in voice recognition technology.    Dear Dr. Cadena:      I had the pleasure of meeting with Ms. Kitchen for a return consultation.  As you know, Ms. Kitchen  is a 33 y.o.  at 27w6d (IVF) who is being followed by our office for multiple medical issues.      Today, Ms. Kitchen reports that she feels well.  She notes good fetal movement and denies any symptoms of leaking of fluid, vaginal bleeding, and/or contractions.  She had a fetal ultrasound that was notable for the following.  There is a single intrauterine gestation in a cephalic presentation with a heart rate of 128 beats per minute.  The placenta is posterior.  The amniotic fluid index is 13.7 cm.  Transvaginal cervical length 3-3.3 cm without funneling.      PERTINENT PHYSICAL EXAMINATION:   /76   Pulse 82   Wt 135.3 kg (298 lb 6 oz)   BMI 46.73 kg/m²     Urine dipstick:   Negative for Glucose    Trace for Albumin      A fetal ultrasound assessment was performed today. A report is enclosed for your review.    Assessment & Plan:  33 y.o.  at 27w6d (IVF) with:    1.  Genetic counseling -- The patient was previously counseled regarding her options for genetic screening and/or diagnostic testing.  Counseling was reviewed.     The patient had that preimplantation genetic testing with her IVF.     The patient's prenatal record was reviewed.  She completed a expanded carrier panel.  She was noted to be a carrier for cystic fibrosis and MCAD.  Her partner was not a carrier for these conditions.  See below.     The patient was also counseled regarding the recommendation for maternal serum AFP to screen for open neural tube defects.  Risks and benefits of screening were reviewed.  Testing is recommended at 15 to 22 weeks gestation.  --The patient reports that she had

## 2024-04-24 PROBLEM — R79.89 ELEVATED LFTS: Status: ACTIVE | Noted: 2024-04-24

## 2024-04-24 PROBLEM — R80.9 URINE PROTEIN INCREASED: Status: ACTIVE | Noted: 2024-04-24

## 2024-04-24 PROBLEM — E63.9 NUTRITIONAL DEFICIENCY: Status: ACTIVE | Noted: 2024-04-24

## 2024-05-01 ENCOUNTER — ROUTINE PRENATAL (OUTPATIENT)
Dept: OBGYN CLINIC | Age: 34
End: 2024-05-01
Payer: COMMERCIAL

## 2024-05-01 ENCOUNTER — ANCILLARY PROCEDURE (OUTPATIENT)
Dept: OBGYN CLINIC | Age: 34
End: 2024-05-01
Payer: COMMERCIAL

## 2024-05-01 VITALS
SYSTOLIC BLOOD PRESSURE: 130 MMHG | BODY MASS INDEX: 47.77 KG/M2 | DIASTOLIC BLOOD PRESSURE: 78 MMHG | HEART RATE: 66 BPM | WEIGHT: 293 LBS

## 2024-05-01 DIAGNOSIS — O09.812 PREGNANCY RESULTING FROM IN VITRO FERTILIZATION IN SECOND TRIMESTER: Primary | ICD-10-CM

## 2024-05-01 DIAGNOSIS — E66.01 CLASS 3 SEVERE OBESITY DUE TO EXCESS CALORIES WITHOUT SERIOUS COMORBIDITY WITH BODY MASS INDEX (BMI) OF 45.0 TO 49.9 IN ADULT (HCC): ICD-10-CM

## 2024-05-01 DIAGNOSIS — O26.879 SHORT CERVIX AFFECTING PREGNANCY: ICD-10-CM

## 2024-05-01 LAB
GLUCOSE URINE, POC: NORMAL
PROTEIN UA: NEGATIVE

## 2024-05-01 PROCEDURE — 76819 FETAL BIOPHYS PROFIL W/O NST: CPT | Performed by: OBSTETRICS & GYNECOLOGY

## 2024-05-01 PROCEDURE — 99214 OFFICE O/P EST MOD 30 MIN: CPT | Performed by: OBSTETRICS & GYNECOLOGY

## 2024-05-01 PROCEDURE — 76821 MIDDLE CEREBRAL ARTERY ECHO: CPT | Performed by: OBSTETRICS & GYNECOLOGY

## 2024-05-01 PROCEDURE — 81002 URINALYSIS NONAUTO W/O SCOPE: CPT | Performed by: OBSTETRICS & GYNECOLOGY

## 2024-05-01 PROCEDURE — 99213 OFFICE O/P EST LOW 20 MIN: CPT | Performed by: OBSTETRICS & GYNECOLOGY

## 2024-05-01 PROCEDURE — 76816 OB US FOLLOW-UP PER FETUS: CPT | Performed by: OBSTETRICS & GYNECOLOGY

## 2024-05-01 PROCEDURE — 76817 TRANSVAGINAL US OBSTETRIC: CPT | Performed by: OBSTETRICS & GYNECOLOGY

## 2024-05-01 PROCEDURE — 76820 UMBILICAL ARTERY ECHO: CPT | Performed by: OBSTETRICS & GYNECOLOGY

## 2024-05-01 NOTE — PROGRESS NOTES
Davina Kitchen presents to office today for follow up.  Patient denies bleeding, LOF, or cramping.   Positive fetal movement.

## 2024-05-01 NOTE — PROGRESS NOTES
May 1, 2024      Zev Cadena MD  [address] [place] [address]     RE:  CARLOS KITCHEN  : 1990   AGE: 33 y.o.    This report has been created using voice recognition software. It may contain errors which are inherent in voice recognition technology.    Dear Dr. Cadena:      I had the pleasure of meeting with Ms. Kitchen for a return consultation.  As you know, Ms. Kitchen  is a 33 y.o.  at 29w6d (IVF) who is being followed by our office for multiple medical issues.      Today, Ms. Kitchen reports that she feels well.  She notes good fetal movement and denies any symptoms of leaking of fluid, vaginal bleeding, and/or contractions.  She had a fetal ultrasound that was notable for the following.  There is a single intrauterine gestation in a cephalic presentation with a heart rate of 24 beats per minute.  The placenta is posterior/right.  The amniotic fluid index is 12.1 cm.  The composite gestational age is 30w3d.  The estimated fetal weight is at the 63rd percentile.   BPP 8/8. Umbilical artery Doppler studies are normal.  MCA Dopplers normal.  CPR PI normal.  Transvaginal cervical length 2.6 cm without funneling.      PERTINENT PHYSICAL EXAMINATION:   /78   Pulse 66   Wt (!) 138.3 kg (305 lb)   BMI 47.77 kg/m²     Urine dipstick:   Negative for Glucose    Negative for Albumin      A fetal ultrasound assessment was performed today. A report is enclosed for your review.    Assessment & Plan:  33 y.o.  at 29w6d (IVF) with:    1.  Genetic counseling -- The patient was previously counseled regarding her options for genetic screening and/or diagnostic testing.  Counseling was reviewed.     The patient had that preimplantation genetic testing with her IVF.     The patient's prenatal record was reviewed.  She completed a expanded carrier panel.  She was noted to be a carrier for cystic fibrosis and MCAD.  Her partner was not a carrier for these conditions.  See

## 2024-05-01 NOTE — PATIENT INSTRUCTIONS
Weeks 30 to 32 of Your Pregnancy: Care Instructions  Your baby is starting to look like a baby, with hair and plump skin. Your baby may sleep 20 to 45 minutes at a time and is more active at certain times of day.  You should feel your baby move several times every day. Your baby now turns less and kicks more.   This is a good time to tour your hospital or birthing center. You may also want to find childcare if needed.      To ease heartburn   Avoid foods that make your symptoms worse, such as chocolate, spicy foods, and caffeine.  Avoid bending over or lying down after meals.  Do not eat for 2 hours before bedtime.  Take antacids like Tums, but don't take ones that have sodium bicarbonate.     To care for large, swollen veins (varicose veins)   Avoid standing for long periods of time.  Sit with your feet propped up.  Wear support hose.  Get some exercise every day, like walking or swimming.  Counting your baby's kicks  Your doctor may ask you to count your baby's movements, such as kicks, flutters, or rolls.  Find a quiet place, and get comfortable. Write down your start time. Count your baby's movements (except hiccups). When your baby has moved 10 times, you can stop counting. Write down how many minutes it took.   If an hour goes by and you don't feel 10 movements, have something to eat or drink. Count for another hour. If you don't feel at least 10 movements in the 2-hour period, call your doctor.   Follow-up care is a key part of your treatment and safety. Be sure to make and go to all appointments, and call your doctor if you are having problems. It's also a good idea to know your test results and keep a list of the medicines you take.  Where can you learn more?  Go to https://www.Pins.net/patientEd and enter X471 to learn more about \"Weeks 30 to 32 of Your Pregnancy: Care Instructions.\"  Current as of: February 23, 2022               Content Version: 13.5  © 2689-1501 [employer].

## 2024-05-08 ENCOUNTER — ANCILLARY PROCEDURE (OUTPATIENT)
Dept: OBGYN CLINIC | Age: 34
End: 2024-05-08
Payer: COMMERCIAL

## 2024-05-08 ENCOUNTER — ROUTINE PRENATAL (OUTPATIENT)
Dept: OBGYN CLINIC | Age: 34
End: 2024-05-08
Payer: COMMERCIAL

## 2024-05-08 VITALS
WEIGHT: 293 LBS | SYSTOLIC BLOOD PRESSURE: 135 MMHG | BODY MASS INDEX: 47.97 KG/M2 | HEART RATE: 75 BPM | DIASTOLIC BLOOD PRESSURE: 80 MMHG

## 2024-05-08 DIAGNOSIS — Z3A.30 30 WEEKS GESTATION OF PREGNANCY: Primary | ICD-10-CM

## 2024-05-08 DIAGNOSIS — R80.9 URINE PROTEIN INCREASED: ICD-10-CM

## 2024-05-08 DIAGNOSIS — O09.812 PREGNANCY RESULTING FROM IN VITRO FERTILIZATION IN SECOND TRIMESTER: ICD-10-CM

## 2024-05-08 DIAGNOSIS — O26.879 SHORT CERVIX AFFECTING PREGNANCY: ICD-10-CM

## 2024-05-08 DIAGNOSIS — R79.89 LOW VITAMIN B12 LEVEL: ICD-10-CM

## 2024-05-08 DIAGNOSIS — E63.9 NUTRITIONAL DEFICIENCY: ICD-10-CM

## 2024-05-08 LAB
GLUCOSE URINE, POC: NEGATIVE
PROTEIN UA: NEGATIVE

## 2024-05-08 PROCEDURE — 76817 TRANSVAGINAL US OBSTETRIC: CPT | Performed by: OBSTETRICS & GYNECOLOGY

## 2024-05-08 PROCEDURE — 99213 OFFICE O/P EST LOW 20 MIN: CPT | Performed by: OBSTETRICS & GYNECOLOGY

## 2024-05-08 PROCEDURE — 81002 URINALYSIS NONAUTO W/O SCOPE: CPT | Performed by: OBSTETRICS & GYNECOLOGY

## 2024-05-08 PROCEDURE — 76815 OB US LIMITED FETUS(S): CPT | Performed by: OBSTETRICS & GYNECOLOGY

## 2024-05-08 PROCEDURE — 76821 MIDDLE CEREBRAL ARTERY ECHO: CPT | Performed by: OBSTETRICS & GYNECOLOGY

## 2024-05-08 PROCEDURE — 76819 FETAL BIOPHYS PROFIL W/O NST: CPT | Performed by: OBSTETRICS & GYNECOLOGY

## 2024-05-08 PROCEDURE — 76820 UMBILICAL ARTERY ECHO: CPT | Performed by: OBSTETRICS & GYNECOLOGY

## 2024-05-08 NOTE — PROGRESS NOTES
Davina Kitchen presents to office today for US/NST.   Patient denies bleeding, LOF, or cramping.   Positive fetal movement.     
screening were reviewed.  Testing is recommended at 15 to 22 weeks gestation.  --The patient reports that she had testing done locally.  -- Testing was completed on 2024.  The maternal serum AFP was normal at 0.69 MOM.     2.  IVF Pregnancy -- The patient reported this pregnancy was conceived via in vitro fertilization  and intracytoplasmic sperm injection (ICSI).  She had a frozen embryo transfer.  Per the patient, preimplantation genetic testing was completed and normal.      Counseling was previously provided.  Counseling was reviewed.  She was counseled that pregnancies conceived using in vitro fertilization may be at increased risk for obstetric complications such as poor fetal growth,  delivery, hypertensive disorders of pregnancy, and/or congenital anomalies (heart).  Because of these increased risks, increased surveillance is recommended.      A fetal echocardiogram is recommended at 22-24 weeks gestation.      --A referral was previously provided.  --The patient declined a fetal echocardiogram at this time secondary to cost.  Plan to monitor the appearance of the fetal heart.  With any concerns, will refer back to pediatric cardiology.     Fetal growth should be monitored serially, every 3-4 weeks beginning a 24-26 weeks' gestation.   --Fetal growth was appropriate for the gestational age at 20 weeks 0 days.  --Fetal growth was appropriate for the gestational age at 23 weeks 0 days  --Fetal growth was appropriate for the gestational age at 26 weeks 2 days  --Fetal growth was appropriate for the gestational age at 29 weeks 6 days     Fetal kick counts should be monitored daily starting at 28 weeks' gestation.  Twice-weekly fetal testing is recommended starting at 32 weeks gestation.  Delivery is recommended at/by 39 weeks gestation.     3.  History of infertility -- The patient reported a history of infertility with prior evaluation by a specialist.  This pregnancy was conceived using IVF.  The

## 2024-05-15 ENCOUNTER — ANCILLARY PROCEDURE (OUTPATIENT)
Dept: OBGYN CLINIC | Age: 34
End: 2024-05-15
Payer: COMMERCIAL

## 2024-05-15 ENCOUNTER — ROUTINE PRENATAL (OUTPATIENT)
Dept: OBGYN CLINIC | Age: 34
End: 2024-05-15
Payer: COMMERCIAL

## 2024-05-15 VITALS
HEART RATE: 71 BPM | SYSTOLIC BLOOD PRESSURE: 118 MMHG | BODY MASS INDEX: 48.51 KG/M2 | DIASTOLIC BLOOD PRESSURE: 74 MMHG | WEIGHT: 293 LBS

## 2024-05-15 DIAGNOSIS — O26.879 SHORT CERVIX AFFECTING PREGNANCY: ICD-10-CM

## 2024-05-15 DIAGNOSIS — Z3A.31 31 WEEKS GESTATION OF PREGNANCY: Primary | ICD-10-CM

## 2024-05-15 LAB
GLUCOSE URINE, POC: NEGATIVE
PROTEIN UA: NEGATIVE

## 2024-05-15 PROCEDURE — 76817 TRANSVAGINAL US OBSTETRIC: CPT | Performed by: OBSTETRICS & GYNECOLOGY

## 2024-05-15 PROCEDURE — 76818 FETAL BIOPHYS PROFILE W/NST: CPT | Performed by: OBSTETRICS & GYNECOLOGY

## 2024-05-15 PROCEDURE — 76815 OB US LIMITED FETUS(S): CPT | Performed by: OBSTETRICS & GYNECOLOGY

## 2024-05-15 PROCEDURE — 99212 OFFICE O/P EST SF 10 MIN: CPT | Performed by: OBSTETRICS & GYNECOLOGY

## 2024-05-15 PROCEDURE — 76821 MIDDLE CEREBRAL ARTERY ECHO: CPT | Performed by: OBSTETRICS & GYNECOLOGY

## 2024-05-15 PROCEDURE — 81002 URINALYSIS NONAUTO W/O SCOPE: CPT | Performed by: OBSTETRICS & GYNECOLOGY

## 2024-05-15 PROCEDURE — 99213 OFFICE O/P EST LOW 20 MIN: CPT | Performed by: OBSTETRICS & GYNECOLOGY

## 2024-05-15 PROCEDURE — 76820 UMBILICAL ARTERY ECHO: CPT | Performed by: OBSTETRICS & GYNECOLOGY

## 2024-05-15 NOTE — PROGRESS NOTES
Davina Kitchen presents to office today for US/NST.  Patient denies bleeding, LOF, or cramping.   Positive fetal movement.     
specialist.  This pregnancy was conceived using IVF.  The patient reports that there was a male factor with low sperm counts.  She also has a history of PCOS.     Counseling was previously provided to the patient.  Counseling was reviewed.     Women who use infertility therapies (in vitro fertilization [IVF] or non-IVF) appear to have a small but statistically significant increase in risk of some pregnancy complications, such as  birth. Compared to the general population, an increased risk of  birth, hypertensive disorders of pregnancy, and low birth weight has also been observed among untreated subfertile women who conceived naturally.      Secondary to the risks outlined above, increased maternal and fetal surveillance is recommended.  Fetal growth to be monitored serially, every 3 to 4 weeks for the remainder of the pregnancy.  --Fetal growth was appropriate for the gestational age at 20 weeks 0 days.  --Fetal growth was appropriate for the gestational age at 23 weeks 0 days  --Fetal growth was appropriate for the gestational age at 26 weeks 2 days  --Fetal growth was appropriate for the gestational age at 29 weeks 6 days     She should have increased fetal testing as outlined above.     Secondary to the increased risk for hypertensive disorders of pregnancy, a daily low dose aspirin is recommended, 81 mg.  She should continue taking a daily low-dose aspirin for the remainder of pregnancy and 6 to 8 weeks postpartum.     Given her history, additional labs were recommended including a Hgb A1c, thyroid function studies/TPO, and antiphospholipid antibody studies.   -- The patient reports that she completed testing at [facility].  Testing was completed on 2024, results included: H/H13.2/39.7, MCV 91, platelet count 363,000, potassium 3.9, creatinine 0.56, calcium 9.1, AST 14, ALT 33, uric acid 4.2, magnesium 1.8, hemoglobin A1c 5.2%, ferritin 134 (), folate >20, vitamin B12 363 ([phone]),

## 2024-05-21 ENCOUNTER — ROUTINE PRENATAL (OUTPATIENT)
Dept: OBGYN CLINIC | Age: 34
End: 2024-05-21
Payer: COMMERCIAL

## 2024-05-21 VITALS
BODY MASS INDEX: 48.32 KG/M2 | HEART RATE: 68 BPM | DIASTOLIC BLOOD PRESSURE: 83 MMHG | WEIGHT: 293 LBS | SYSTOLIC BLOOD PRESSURE: 135 MMHG

## 2024-05-21 DIAGNOSIS — O26.879 SHORT CERVIX AFFECTING PREGNANCY: ICD-10-CM

## 2024-05-21 DIAGNOSIS — O09.812 PREGNANCY RESULTING FROM IN VITRO FERTILIZATION IN SECOND TRIMESTER: Primary | ICD-10-CM

## 2024-05-21 DIAGNOSIS — Z3A.32 32 WEEKS GESTATION OF PREGNANCY: ICD-10-CM

## 2024-05-21 DIAGNOSIS — E66.01 MATERNAL MORBID OBESITY IN THIRD TRIMESTER, ANTEPARTUM (HCC): ICD-10-CM

## 2024-05-21 DIAGNOSIS — E66.01 CLASS 3 SEVERE OBESITY DUE TO EXCESS CALORIES WITHOUT SERIOUS COMORBIDITY WITH BODY MASS INDEX (BMI) OF 45.0 TO 49.9 IN ADULT (HCC): ICD-10-CM

## 2024-05-21 DIAGNOSIS — O99.213 MATERNAL MORBID OBESITY IN THIRD TRIMESTER, ANTEPARTUM (HCC): ICD-10-CM

## 2024-05-21 LAB
GLUCOSE URINE, POC: NEGATIVE
PROTEIN UA: NEGATIVE

## 2024-05-21 PROCEDURE — 99213 OFFICE O/P EST LOW 20 MIN: CPT | Performed by: OBSTETRICS & GYNECOLOGY

## 2024-05-21 PROCEDURE — 99999 PR OFFICE/OUTPT VISIT,PROCEDURE ONLY: CPT | Performed by: OBSTETRICS & GYNECOLOGY

## 2024-05-21 PROCEDURE — 59025 FETAL NON-STRESS TEST: CPT | Performed by: OBSTETRICS & GYNECOLOGY

## 2024-05-21 PROCEDURE — 81002 URINALYSIS NONAUTO W/O SCOPE: CPT | Performed by: OBSTETRICS & GYNECOLOGY

## 2024-05-21 NOTE — PROGRESS NOTES
Davina Kitchen presents to office today for NST.  Patient denies bleeding, LOF, or cramping.   Positive fetal movement.

## 2024-05-21 NOTE — PATIENT INSTRUCTIONS
[id number] [employer]".   Care instructions adapted under license by [employer] [facility]. If you have questions about a medical condition or this instruction, always ask your healthcare professional. [employer] disclaims any warranty or liability for your use of this information.

## 2024-05-22 NOTE — PROGRESS NOTES
Zev Cadena MD  [address] [place] [address]     RE:  DAVINA KITCHEN  : 1990   AGE: 33 y.o.    This report has been created using voice recognition software. It may contain errors which are inherent in voice recognition technology.    Dear Dr. Cadena:    Davina Kitchen had a nonstress test performed today for the following indications:    Patient Active Problem List   Diagnosis    Class 3 severe obesity due to excess calories without serious comorbidity with body mass index (BMI) of 45.0 to 49.9 in adult ([facility])    Chronic pain of both knees    Unspecified symptoms and signs involving the genitourinary system    Nausea/vomiting in pregnancy    History of infertility    Pregnancy resulting from in vitro fertilization in second trimester    BMI 45.0-49.9, adult ([facility])    Low-lying placenta    Low vitamin B12 level    Short cervix affecting pregnancy    Urine protein increased    Elevated LFTs    Nutritional deficiency    Maternal morbid obesity in third trimester, antepartum ([facility])     Davina Kitchen is a 33 y.o. female, who is G1(0). She has an Estimated Date of Delivery: 24 based on her established dates.  She is currently 32 weeks 5 days gestation based on that assessment.     The patient's pregnancy was established with in vitro fertilization and intracytoplasmic injection of sperm.    The patient is morbidly obese. This increases her risk for  morbidity and mortality, including, but not limited to; GDM, hypertension, fetal growth abnormalities and pregnancy loss.    The patient is a carrier for MCAD and cystic fibrosis.  Her partner has not been identified as a carrier for these conditions.  The patient was previously counseled by Dr. Baig regarding these conditions.    The nonstress test was reactive with moderate variability and accelerations.     OB History    Para Term  AB Living   1             SAB IAB Ectopic Molar Multiple Live

## 2024-05-24 ENCOUNTER — ANCILLARY PROCEDURE (OUTPATIENT)
Dept: OBGYN CLINIC | Age: 34
End: 2024-05-24
Payer: COMMERCIAL

## 2024-05-24 ENCOUNTER — ROUTINE PRENATAL (OUTPATIENT)
Dept: OBGYN CLINIC | Age: 34
End: 2024-05-24
Payer: COMMERCIAL

## 2024-05-24 VITALS
DIASTOLIC BLOOD PRESSURE: 78 MMHG | BODY MASS INDEX: 48.43 KG/M2 | HEART RATE: 66 BPM | SYSTOLIC BLOOD PRESSURE: 128 MMHG | WEIGHT: 293 LBS

## 2024-05-24 DIAGNOSIS — Z3A.33 33 WEEKS GESTATION OF PREGNANCY: Primary | ICD-10-CM

## 2024-05-24 DIAGNOSIS — R80.9 URINE PROTEIN INCREASED: ICD-10-CM

## 2024-05-24 DIAGNOSIS — R79.89 LOW VITAMIN B12 LEVEL: ICD-10-CM

## 2024-05-24 DIAGNOSIS — R79.89 ELEVATED LFTS: ICD-10-CM

## 2024-05-24 DIAGNOSIS — O26.879 SHORT CERVIX AFFECTING PREGNANCY: ICD-10-CM

## 2024-05-24 DIAGNOSIS — O21.9 NAUSEA/VOMITING IN PREGNANCY: ICD-10-CM

## 2024-05-24 DIAGNOSIS — E63.9 NUTRITIONAL DEFICIENCY: ICD-10-CM

## 2024-05-24 LAB
GLUCOSE URINE, POC: NEGATIVE
PROTEIN UA: NEGATIVE

## 2024-05-24 PROCEDURE — 81002 URINALYSIS NONAUTO W/O SCOPE: CPT | Performed by: OBSTETRICS & GYNECOLOGY

## 2024-05-24 PROCEDURE — 76817 TRANSVAGINAL US OBSTETRIC: CPT | Performed by: OBSTETRICS & GYNECOLOGY

## 2024-05-24 PROCEDURE — 76821 MIDDLE CEREBRAL ARTERY ECHO: CPT | Performed by: OBSTETRICS & GYNECOLOGY

## 2024-05-24 PROCEDURE — 76816 OB US FOLLOW-UP PER FETUS: CPT | Performed by: OBSTETRICS & GYNECOLOGY

## 2024-05-24 PROCEDURE — 76818 FETAL BIOPHYS PROFILE W/NST: CPT | Performed by: OBSTETRICS & GYNECOLOGY

## 2024-05-24 PROCEDURE — 99213 OFFICE O/P EST LOW 20 MIN: CPT | Performed by: OBSTETRICS & GYNECOLOGY

## 2024-05-24 PROCEDURE — 76820 UMBILICAL ARTERY ECHO: CPT | Performed by: OBSTETRICS & GYNECOLOGY

## 2024-05-24 NOTE — PROGRESS NOTES
Patient is here for bpp/nst. Patient denies any vaginal bleeding, leakage of fluid or cramping. Patient has good fetal movement.    
    --At the conclusion of the visit, the patient appeared to have a good understanding of the issues discussed.  I answered all of her questions to her satisfaction. I asked her to call if she had any additional questions prior to her next visit.      --Thank you for allowing me to participate in the care of this pleasant patient.  Please don't hesitate to call me if you have any questions.      Sincerely,      Kindra Baig MD, FACOG [facility] MFM  [phone] option 1   [address] [place] [address]      *All or parts of this note may have been generated using a voice recognition program. There may be typo, grammar, or Word substitution errors that have escaped my review of this note.

## 2024-05-28 ENCOUNTER — ROUTINE PRENATAL (OUTPATIENT)
Dept: OBGYN CLINIC | Age: 34
End: 2024-05-28
Payer: COMMERCIAL

## 2024-05-28 VITALS
WEIGHT: 293 LBS | SYSTOLIC BLOOD PRESSURE: 134 MMHG | DIASTOLIC BLOOD PRESSURE: 81 MMHG | HEART RATE: 69 BPM | BODY MASS INDEX: 49.24 KG/M2

## 2024-05-28 DIAGNOSIS — Z3A.33 33 WEEKS GESTATION OF PREGNANCY: ICD-10-CM

## 2024-05-28 DIAGNOSIS — O09.812 PREGNANCY RESULTING FROM IN VITRO FERTILIZATION IN SECOND TRIMESTER: ICD-10-CM

## 2024-05-28 DIAGNOSIS — O26.879 SHORT CERVIX AFFECTING PREGNANCY: Primary | ICD-10-CM

## 2024-05-28 LAB
GLUCOSE URINE, POC: NEGATIVE
PROTEIN UA: NEGATIVE

## 2024-05-28 PROCEDURE — 99999 PR OFFICE/OUTPT VISIT,PROCEDURE ONLY: CPT | Performed by: OBSTETRICS & GYNECOLOGY

## 2024-05-28 PROCEDURE — 59025 FETAL NON-STRESS TEST: CPT | Performed by: OBSTETRICS & GYNECOLOGY

## 2024-05-28 PROCEDURE — 99213 OFFICE O/P EST LOW 20 MIN: CPT | Performed by: OBSTETRICS & GYNECOLOGY

## 2024-05-28 PROCEDURE — 81002 URINALYSIS NONAUTO W/O SCOPE: CPT | Performed by: OBSTETRICS & GYNECOLOGY

## 2024-05-28 NOTE — PROGRESS NOTES
NON STRESS TEST INTERPRETATION    24    RE:  Davina Kitchen   : 1990   AGE: 33 y.o.    GESTATIONAL AGE:  33w5d    DIAGNOSIS:   IVF pregnancy, BMI 48, short cervix    INDICATION:  Same as above    TIME ON:  901      TIME OFF:  927      RESULT:   REACTIVE      FHR Baseline Rate:   125 bpm    PERIODIC CHANGES:    Accelerations present, variability moderate, no decelerations noted    COMMENTS:      She is to continue having NST's every 3-4 days, and BPP with umbilical artery doppler studies once per week        Kindra Baig MD, FACOG

## 2024-05-28 NOTE — PATIENT INSTRUCTIONS
questions about a medical condition or this instruction, always ask your healthcare professional. [employer], [employer] disclaims any warranty or liability for your use of this information.       Patient Education        Learning About When to Call Your Doctor During Pregnancy (After 20 Weeks)  Overview  It's common to have concerns about what might be a problem when you're pregnant. Most pregnancies don't have any serious problems. But it's still important to know when to call your doctor if you have certain symptoms or signs of labor.  These are general suggestions. Your doctor may give you some more information about when to call.  When to call your doctor (after 20 weeks)  Call 911  anytime you think you may need emergency care. For example, call if:  You have severe vaginal bleeding. This means you are soaking through a pad each hour for 2 or more hours.  You have sudden, severe pain in your belly.  You have chest pain, are short of breath, or cough up blood.  You passed out (lost consciousness).  You have a seizure.  You see or feel the umbilical cord.  You think you are about to deliver your baby and can't make it safely to the hospital or birthing center.  Call your doctor now or seek immediate medical care if:  You have vaginal bleeding.  You have belly pain.  You have a fever.  You are dizzy or lightheaded, or you feel like you may faint.  You have signs of a blood clot in your leg (called a deep vein thrombosis), such as:  Pain in the calf, back of the knee, thigh, or groin.  Swelling in your leg or groin.  A color change on the leg or groin. The skin may be reddish or purplish, depending on your usual skin color.  You have symptoms of preeclampsia, such as:  Sudden swelling of your face, hands, or feet.  New vision problems (such as dimness, blurring, or seeing spots).  A severe headache.  You have a sudden release of fluid from your vagina. (You think your water broke.)  You've been having regular

## 2024-05-31 ENCOUNTER — ANCILLARY PROCEDURE (OUTPATIENT)
Dept: OBGYN CLINIC | Age: 34
End: 2024-05-31
Payer: COMMERCIAL

## 2024-05-31 ENCOUNTER — ROUTINE PRENATAL (OUTPATIENT)
Dept: OBGYN CLINIC | Age: 34
End: 2024-05-31
Payer: COMMERCIAL

## 2024-05-31 VITALS
DIASTOLIC BLOOD PRESSURE: 81 MMHG | SYSTOLIC BLOOD PRESSURE: 133 MMHG | BODY MASS INDEX: 48.71 KG/M2 | HEART RATE: 69 BPM | WEIGHT: 293 LBS

## 2024-05-31 DIAGNOSIS — Z3A.34 34 WEEKS GESTATION OF PREGNANCY: Primary | ICD-10-CM

## 2024-05-31 DIAGNOSIS — O26.879 SHORT CERVIX AFFECTING PREGNANCY: ICD-10-CM

## 2024-05-31 DIAGNOSIS — O44.40 LOW-LYING PLACENTA: ICD-10-CM

## 2024-05-31 DIAGNOSIS — O09.812 PREGNANCY RESULTING FROM IN VITRO FERTILIZATION IN SECOND TRIMESTER: ICD-10-CM

## 2024-05-31 DIAGNOSIS — O99.213 MATERNAL MORBID OBESITY IN THIRD TRIMESTER, ANTEPARTUM (HCC): ICD-10-CM

## 2024-05-31 DIAGNOSIS — E66.01 MATERNAL MORBID OBESITY IN THIRD TRIMESTER, ANTEPARTUM (HCC): ICD-10-CM

## 2024-05-31 LAB
GLUCOSE URINE, POC: NORMAL
PROTEIN UA: NEGATIVE

## 2024-05-31 PROCEDURE — 81002 URINALYSIS NONAUTO W/O SCOPE: CPT | Performed by: OBSTETRICS & GYNECOLOGY

## 2024-05-31 PROCEDURE — 76815 OB US LIMITED FETUS(S): CPT | Performed by: OBSTETRICS & GYNECOLOGY

## 2024-05-31 PROCEDURE — 99213 OFFICE O/P EST LOW 20 MIN: CPT | Performed by: OBSTETRICS & GYNECOLOGY

## 2024-05-31 PROCEDURE — 76818 FETAL BIOPHYS PROFILE W/NST: CPT | Performed by: OBSTETRICS & GYNECOLOGY

## 2024-05-31 PROCEDURE — 76820 UMBILICAL ARTERY ECHO: CPT | Performed by: OBSTETRICS & GYNECOLOGY

## 2024-05-31 PROCEDURE — 76821 MIDDLE CEREBRAL ARTERY ECHO: CPT | Performed by: OBSTETRICS & GYNECOLOGY

## 2024-05-31 PROCEDURE — 76817 TRANSVAGINAL US OBSTETRIC: CPT | Performed by: OBSTETRICS & GYNECOLOGY

## 2024-05-31 NOTE — PROGRESS NOTES
Patient is here today for bpp/nst. Denies any vaginal bleeding, cramping, or leakage of fluids.  Patient reports good fetal movement.     
the conclusion of the visit, the patient appeared to have a good understanding of the issues discussed.  I answered all of her questions to her satisfaction. I asked her to call if she had any additional questions prior to her next visit.      --Thank you for allowing me to participate in the care of this pleasant patient.  Please don't hesitate to call me if you have any questions.      Sincerely,      Kindra Baig MD, FACOG [facility] M[facility]  [phone] option 1   [address] [place] [address], [address] [place] [address]      *All or parts of this note may have been generated using a voice recognition program. There may be typo, grammar, or Word substitution errors that have escaped my review of this note.

## 2024-06-04 ENCOUNTER — ROUTINE PRENATAL (OUTPATIENT)
Dept: OBGYN CLINIC | Age: 34
End: 2024-06-04
Payer: COMMERCIAL

## 2024-06-04 ENCOUNTER — ANCILLARY PROCEDURE (OUTPATIENT)
Dept: OBGYN CLINIC | Age: 34
End: 2024-06-04
Payer: COMMERCIAL

## 2024-06-04 VITALS
BODY MASS INDEX: 49.48 KG/M2 | DIASTOLIC BLOOD PRESSURE: 77 MMHG | HEART RATE: 81 BPM | SYSTOLIC BLOOD PRESSURE: 116 MMHG | WEIGHT: 293 LBS

## 2024-06-04 DIAGNOSIS — R94.8 ABNORMAL PLACENTA FUNCTION TEST: ICD-10-CM

## 2024-06-04 DIAGNOSIS — O26.879 SHORT CERVIX AFFECTING PREGNANCY: Primary | ICD-10-CM

## 2024-06-04 DIAGNOSIS — O99.213 MATERNAL MORBID OBESITY IN THIRD TRIMESTER, ANTEPARTUM (HCC): ICD-10-CM

## 2024-06-04 DIAGNOSIS — E66.01 MATERNAL MORBID OBESITY IN THIRD TRIMESTER, ANTEPARTUM (HCC): ICD-10-CM

## 2024-06-04 DIAGNOSIS — Z3A.34 34 WEEKS GESTATION OF PREGNANCY: ICD-10-CM

## 2024-06-04 LAB
GLUCOSE URINE, POC: NEGATIVE
PROTEIN UA: NEGATIVE

## 2024-06-04 PROCEDURE — 76818 FETAL BIOPHYS PROFILE W/NST: CPT | Performed by: OBSTETRICS & GYNECOLOGY

## 2024-06-04 PROCEDURE — 99212 OFFICE O/P EST SF 10 MIN: CPT | Performed by: OBSTETRICS & GYNECOLOGY

## 2024-06-04 PROCEDURE — 99213 OFFICE O/P EST LOW 20 MIN: CPT | Performed by: OBSTETRICS & GYNECOLOGY

## 2024-06-04 PROCEDURE — 81002 URINALYSIS NONAUTO W/O SCOPE: CPT | Performed by: OBSTETRICS & GYNECOLOGY

## 2024-06-04 PROCEDURE — 76821 MIDDLE CEREBRAL ARTERY ECHO: CPT | Performed by: OBSTETRICS & GYNECOLOGY

## 2024-06-04 PROCEDURE — 76815 OB US LIMITED FETUS(S): CPT | Performed by: OBSTETRICS & GYNECOLOGY

## 2024-06-04 PROCEDURE — 76820 UMBILICAL ARTERY ECHO: CPT | Performed by: OBSTETRICS & GYNECOLOGY

## 2024-06-04 NOTE — PROGRESS NOTES
NON STRESS TEST INTERPRETATION    24    RE:  Davina Kitchen   : 1990   AGE: 33 y.o.    GESTATIONAL AGE:  34w5d    DIAGNOSIS:   ***    INDICATION:  ***    TIME ON:  ***      TIME OFF:  ***      RESULT:   ***REACTIVE      FHR Baseline Rate:   *** bpm    PERIODIC CHANGES:    Accelerations present, variability moderate, no decelerations noted    COMMENTS:      ***She is to continue having NST's every 3-4 days, and BPP with umbilical artery doppler studies once per week        Kindra Baig MD, FACOG                
Davina Kitchen presents to office today for NST.  Patient denies bleeding, LOF, or cramping.   Positive fetal movement.     
follow-up consultation.      --I requested the patient return for a follow-up assessment in 3 days unless there is a clinical reason for her to return prior to that time. She is to call if she has any problems or questions prior to her next visit. Further evaluation and management will be dependent on her clinical presentation and the results of her testing.     --The patient was advised to call if she has any increased vaginal discharge, vaginal bleeding, contractions, abdominal pain, back pain or any new significant symptomatology prior to her next visit. I advised her that these are signs and symptoms of cervical change and require follow-up assessment when they occur.  Preeclampsia precautions were also reviewed with the patient.     --The patient is to continue to follow with you in your office for ongoing obstetric care.    --The total time spent on today's visit was 15 minutes.  This included preparation for the visit (i.e. reviewing prior external notes and test results), performance of a medically appropriate history and examination, counseling, orders for medications, tests or other procedures, and coordination of care.  Greater than 50% of the time was spent face-to-face with the patient.  This time is exclusive of procedures performed.   I answered all of  the patient's questions to her satisfaction. I asked her to call if she had any additional questions prior to her next visit.      --At the conclusion of the visit, the patient appeared to have a good understanding of the issues discussed.  I answered all of her questions to her satisfaction. I asked her to call if she had any additional questions prior to her next visit.      --Thank you for allowing me to participate in the care of this pleasant patient.  Please don't hesitate to call me if you have any questions.      Sincerely,      Kindra Baig MD, FACOG [facility]  [phone]      *All or parts of this note

## 2024-06-07 ENCOUNTER — ROUTINE PRENATAL (OUTPATIENT)
Dept: OBGYN CLINIC | Age: 34
End: 2024-06-07
Payer: COMMERCIAL

## 2024-06-07 ENCOUNTER — ANCILLARY PROCEDURE (OUTPATIENT)
Dept: OBGYN CLINIC | Age: 34
End: 2024-06-07
Payer: COMMERCIAL

## 2024-06-07 VITALS
WEIGHT: 293 LBS | SYSTOLIC BLOOD PRESSURE: 135 MMHG | HEART RATE: 77 BPM | BODY MASS INDEX: 49.35 KG/M2 | DIASTOLIC BLOOD PRESSURE: 85 MMHG

## 2024-06-07 DIAGNOSIS — O99.213 MATERNAL MORBID OBESITY IN THIRD TRIMESTER, ANTEPARTUM (HCC): ICD-10-CM

## 2024-06-07 DIAGNOSIS — Z87.42 HISTORY OF INFERTILITY: ICD-10-CM

## 2024-06-07 DIAGNOSIS — O09.812 PREGNANCY RESULTING FROM IN VITRO FERTILIZATION IN SECOND TRIMESTER: ICD-10-CM

## 2024-06-07 DIAGNOSIS — O26.879 SHORT CERVIX AFFECTING PREGNANCY: ICD-10-CM

## 2024-06-07 DIAGNOSIS — Z3A.35 35 WEEKS GESTATION OF PREGNANCY: Primary | ICD-10-CM

## 2024-06-07 DIAGNOSIS — E66.01 MATERNAL MORBID OBESITY IN THIRD TRIMESTER, ANTEPARTUM (HCC): ICD-10-CM

## 2024-06-07 LAB
GLUCOSE URINE, POC: NEGATIVE
PROTEIN UA: NEGATIVE

## 2024-06-07 PROCEDURE — 81002 URINALYSIS NONAUTO W/O SCOPE: CPT | Performed by: OBSTETRICS & GYNECOLOGY

## 2024-06-07 PROCEDURE — 76818 FETAL BIOPHYS PROFILE W/NST: CPT | Performed by: OBSTETRICS & GYNECOLOGY

## 2024-06-07 PROCEDURE — 76821 MIDDLE CEREBRAL ARTERY ECHO: CPT | Performed by: OBSTETRICS & GYNECOLOGY

## 2024-06-07 PROCEDURE — 76820 UMBILICAL ARTERY ECHO: CPT | Performed by: OBSTETRICS & GYNECOLOGY

## 2024-06-07 PROCEDURE — 76815 OB US LIMITED FETUS(S): CPT | Performed by: OBSTETRICS & GYNECOLOGY

## 2024-06-07 PROCEDURE — 99213 OFFICE O/P EST LOW 20 MIN: CPT | Performed by: OBSTETRICS & GYNECOLOGY

## 2024-06-07 NOTE — PROGRESS NOTES
Pt here for bpp/nst.  Pt denies bleeding/ramping/lof.  Pt states good fetal movement.  
abnormalities are detected, she will be contacted with results and follow-up recommendations.      Testing was completed on 4/24.  The patient's urine culture was mixed.  Urine protein creatinine ratio normal at 0.098.     The patient's urine was negative for protein at 34 weeks 1 day.  Her blood pressure was normal at 133/81.    The patient's urine was negative for protein at 35 weeks 1 day.  Her blood pressure was normal at 135/85.     Preeclampsia precautions were reviewed with the patient.         --I requested the patient return for a follow-up assessment in 4 days unless there is a clinical reason for her to return prior to that time. She is to call if she has any problems or questions prior to her next visit. Further evaluation and management will be dependent on her clinical presentation and the results of her testing.     --The patient was advised to call if she has any increased vaginal discharge, vaginal bleeding, contractions, abdominal pain, back pain or any new significant symptomatology prior to her next visit. I advised her that these are signs and symptoms of cervical change and require follow-up assessment when they occur.  Preeclampsia precautions were also reviewed with the patient.     --The patient was also counseled to call and/or return with any concerns for decreased fetal activity.    --The patient is to continue to follow with you in your office for ongoing obstetric care.    --The total time spent on today's visit was 20 minutes.  This included preparation for the visit (i.e. reviewing prior external notes and test results), performance of a medically appropriate history and examination, counseling, orders for medications, tests or other procedures, and coordination of care.  Greater than 50% of the time was spent face-to-face with the patient.  This time is exclusive of procedures performed.   I answered all of  the patient's questions to her satisfaction. I asked her to call if she had

## 2024-06-07 NOTE — PATIENT INSTRUCTIONS
Please arrive for your scheduled appointment at least 15 minutes early with your actual insurance card+ a photo ID. Also if you need any refills ordered or have questions, it may take up 48 hours to reply. Please allow ample time for your refills. Call me when you use last refill. Thank you for your cooperation. You might be having an NST at your next appt. Please eat a large snack or breakfast before coming to office. Thank youCall your primary obstetrician with bleeding, leaking of fluid, abdominal tenderness, headache, blurry vision, epigastric pain and increased urinary frequency.If you are experiencing an emergency and need immediate help, call 911 or go to go emergency room or labor and delivery. Do kick counts after dinner. Call your primary obstetrician if less than 10 kicks in 2 hours after dinner.     Call your primary obstetrician with bleeding, leaking of fluid, abdominal tenderness, headache, blurry vision, epigastric pain and increased urinary frequency.if you are sick, not feeling well or have an infectious process going on please reschedule your appointment by calling [phone]. Also if any family members are not feeling well, please do not bring them to your appointment. We appreciate your cooperation. We are doing this in order to protect our pregnant mothers+ their babies.if you are sick, not feeling well or have an infectious process going on please reschedule your appointment by calling [phone]. Also if any family members are not feeling well, please do not bring them to your appointment. We appreciate your cooperation. We are doing this in order to protect our pregnant mothers+ their babies.

## 2024-06-11 ENCOUNTER — ROUTINE PRENATAL (OUTPATIENT)
Dept: OBGYN CLINIC | Age: 34
End: 2024-06-11
Payer: COMMERCIAL

## 2024-06-11 VITALS
DIASTOLIC BLOOD PRESSURE: 84 MMHG | WEIGHT: 293 LBS | HEART RATE: 74 BPM | BODY MASS INDEX: 49.62 KG/M2 | SYSTOLIC BLOOD PRESSURE: 130 MMHG

## 2024-06-11 DIAGNOSIS — Z3A.35 35 WEEKS GESTATION OF PREGNANCY: ICD-10-CM

## 2024-06-11 DIAGNOSIS — O26.879 SHORT CERVIX AFFECTING PREGNANCY: Primary | ICD-10-CM

## 2024-06-11 DIAGNOSIS — O99.213 MATERNAL MORBID OBESITY IN THIRD TRIMESTER, ANTEPARTUM (HCC): ICD-10-CM

## 2024-06-11 DIAGNOSIS — E66.01 MATERNAL MORBID OBESITY IN THIRD TRIMESTER, ANTEPARTUM (HCC): ICD-10-CM

## 2024-06-11 DIAGNOSIS — R94.8 ABNORMAL PLACENTA FUNCTION TEST: ICD-10-CM

## 2024-06-11 LAB
GLUCOSE URINE, POC: NEGATIVE
PROTEIN UA: NEGATIVE

## 2024-06-11 PROCEDURE — 99213 OFFICE O/P EST LOW 20 MIN: CPT | Performed by: OBSTETRICS & GYNECOLOGY

## 2024-06-11 PROCEDURE — 81002 URINALYSIS NONAUTO W/O SCOPE: CPT | Performed by: OBSTETRICS & GYNECOLOGY

## 2024-06-11 PROCEDURE — 59025 FETAL NON-STRESS TEST: CPT | Performed by: OBSTETRICS & GYNECOLOGY

## 2024-06-11 PROCEDURE — 99999 PR OFFICE/OUTPT VISIT,PROCEDURE ONLY: CPT | Performed by: OBSTETRICS & GYNECOLOGY

## 2024-06-11 NOTE — PROGRESS NOTES
NON STRESS TEST INTERPRETATION    24    RE:  Davina Kitchen   : 1990   AGE: 33 y.o.    GESTATIONAL AGE:  35w5d    DIAGNOSIS:   IVF pregnancy, BMI 49, cervical shortening, elevated umbilical artery PI    INDICATION:  Same as above    TIME ON:  822      TIME OFF:  844      RESULT:   REACTIVE      FHR Baseline Rate:   125 bpm    PERIODIC CHANGES:    Accelerations present, variability moderate, no decelerations noted    COMMENTS:      She is to continue having NST's every 3-4 days, and BPP with umbilical artery doppler studies once per week        Kindra Baig MD, FACOG

## 2024-06-14 ENCOUNTER — ANCILLARY PROCEDURE (OUTPATIENT)
Dept: OBGYN CLINIC | Age: 34
End: 2024-06-14
Payer: COMMERCIAL

## 2024-06-14 ENCOUNTER — ROUTINE PRENATAL (OUTPATIENT)
Dept: OBGYN CLINIC | Age: 34
End: 2024-06-14
Payer: COMMERCIAL

## 2024-06-14 VITALS
HEART RATE: 74 BPM | DIASTOLIC BLOOD PRESSURE: 73 MMHG | SYSTOLIC BLOOD PRESSURE: 128 MMHG | WEIGHT: 293 LBS | BODY MASS INDEX: 49.49 KG/M2

## 2024-06-14 DIAGNOSIS — Z3A.36 36 WEEKS GESTATION OF PREGNANCY: Primary | ICD-10-CM

## 2024-06-14 DIAGNOSIS — R94.8 ABNORMAL PLACENTA FUNCTION TEST: ICD-10-CM

## 2024-06-14 DIAGNOSIS — O44.40 LOW-LYING PLACENTA: ICD-10-CM

## 2024-06-14 DIAGNOSIS — R80.9 URINE PROTEIN INCREASED: ICD-10-CM

## 2024-06-14 DIAGNOSIS — R79.89 LOW VITAMIN B12 LEVEL: ICD-10-CM

## 2024-06-14 PROCEDURE — 76816 OB US FOLLOW-UP PER FETUS: CPT | Performed by: OBSTETRICS & GYNECOLOGY

## 2024-06-14 PROCEDURE — 76818 FETAL BIOPHYS PROFILE W/NST: CPT | Performed by: OBSTETRICS & GYNECOLOGY

## 2024-06-14 PROCEDURE — 99213 OFFICE O/P EST LOW 20 MIN: CPT | Performed by: OBSTETRICS & GYNECOLOGY

## 2024-06-14 PROCEDURE — 76820 UMBILICAL ARTERY ECHO: CPT | Performed by: OBSTETRICS & GYNECOLOGY

## 2024-06-14 PROCEDURE — 76821 MIDDLE CEREBRAL ARTERY ECHO: CPT | Performed by: OBSTETRICS & GYNECOLOGY

## 2024-06-18 ENCOUNTER — ROUTINE PRENATAL (OUTPATIENT)
Dept: OBGYN CLINIC | Age: 34
End: 2024-06-18
Payer: COMMERCIAL

## 2024-06-18 VITALS
DIASTOLIC BLOOD PRESSURE: 78 MMHG | WEIGHT: 293 LBS | BODY MASS INDEX: 49.46 KG/M2 | HEART RATE: 69 BPM | SYSTOLIC BLOOD PRESSURE: 128 MMHG

## 2024-06-18 DIAGNOSIS — R94.8 ABNORMAL PLACENTA FUNCTION TEST: ICD-10-CM

## 2024-06-18 DIAGNOSIS — E66.01 MATERNAL MORBID OBESITY IN THIRD TRIMESTER, ANTEPARTUM (HCC): ICD-10-CM

## 2024-06-18 DIAGNOSIS — O99.213 MATERNAL MORBID OBESITY IN THIRD TRIMESTER, ANTEPARTUM (HCC): ICD-10-CM

## 2024-06-18 DIAGNOSIS — O26.879 SHORT CERVIX AFFECTING PREGNANCY: ICD-10-CM

## 2024-06-18 DIAGNOSIS — Z3A.36 36 WEEKS GESTATION OF PREGNANCY: Primary | ICD-10-CM

## 2024-06-18 LAB
GLUCOSE URINE, POC: NEGATIVE
PROTEIN UA: NEGATIVE

## 2024-06-18 PROCEDURE — 59025 FETAL NON-STRESS TEST: CPT | Performed by: OBSTETRICS & GYNECOLOGY

## 2024-06-18 PROCEDURE — 99999 PR OFFICE/OUTPT VISIT,PROCEDURE ONLY: CPT | Performed by: OBSTETRICS & GYNECOLOGY

## 2024-06-18 PROCEDURE — 99213 OFFICE O/P EST LOW 20 MIN: CPT | Performed by: OBSTETRICS & GYNECOLOGY

## 2024-06-18 PROCEDURE — 81002 URINALYSIS NONAUTO W/O SCOPE: CPT | Performed by: OBSTETRICS & GYNECOLOGY

## 2024-06-18 NOTE — PROGRESS NOTES
NON STRESS TEST INTERPRETATION    24    RE:  Davina Kitchen   : 1990   AGE: 33 y.o.    GESTATIONAL AGE:  36w5d    DIAGNOSIS:   IVF pregnancy, history of infertility, BMI 49, elevated umbilical artery PI, cervical shortening    INDICATION:  Same as above    TIME ON:  0830      TIME OFF:  0850      RESULT:   REACTIVE      FHR Baseline Rate:   125 bpm    PERIODIC CHANGES:    Accelerations present, variability moderate, no decelerations noted    COMMENTS:      She is to continue having NST's every 3-4 days, and BPP with umbilical artery doppler studies once per week        Kindra Baig MD, FACOG

## 2024-06-21 ENCOUNTER — ROUTINE PRENATAL (OUTPATIENT)
Dept: OBGYN CLINIC | Age: 34
End: 2024-06-21
Payer: COMMERCIAL

## 2024-06-21 ENCOUNTER — ANCILLARY PROCEDURE (OUTPATIENT)
Dept: OBGYN CLINIC | Age: 34
End: 2024-06-21
Payer: COMMERCIAL

## 2024-06-21 VITALS
BODY MASS INDEX: 49.49 KG/M2 | SYSTOLIC BLOOD PRESSURE: 113 MMHG | WEIGHT: 293 LBS | DIASTOLIC BLOOD PRESSURE: 74 MMHG | HEART RATE: 78 BPM

## 2024-06-21 DIAGNOSIS — O09.812 PREGNANCY RESULTING FROM IN VITRO FERTILIZATION IN SECOND TRIMESTER: ICD-10-CM

## 2024-06-21 DIAGNOSIS — O21.9 NAUSEA/VOMITING IN PREGNANCY: ICD-10-CM

## 2024-06-21 DIAGNOSIS — O26.879 SHORT CERVIX AFFECTING PREGNANCY: ICD-10-CM

## 2024-06-21 DIAGNOSIS — Z87.42 HISTORY OF INFERTILITY: ICD-10-CM

## 2024-06-21 DIAGNOSIS — R94.8 ABNORMAL PLACENTA FUNCTION TEST: ICD-10-CM

## 2024-06-21 DIAGNOSIS — Z3A.37 37 WEEKS GESTATION OF PREGNANCY: Primary | ICD-10-CM

## 2024-06-21 LAB
GLUCOSE URINE, POC: NEGATIVE
PROTEIN UA: NEGATIVE

## 2024-06-21 PROCEDURE — 76820 UMBILICAL ARTERY ECHO: CPT | Performed by: OBSTETRICS & GYNECOLOGY

## 2024-06-21 PROCEDURE — 81002 URINALYSIS NONAUTO W/O SCOPE: CPT | Performed by: OBSTETRICS & GYNECOLOGY

## 2024-06-21 PROCEDURE — 76818 FETAL BIOPHYS PROFILE W/NST: CPT | Performed by: OBSTETRICS & GYNECOLOGY

## 2024-06-21 PROCEDURE — 76815 OB US LIMITED FETUS(S): CPT | Performed by: OBSTETRICS & GYNECOLOGY

## 2024-06-21 PROCEDURE — 99213 OFFICE O/P EST LOW 20 MIN: CPT | Performed by: OBSTETRICS & GYNECOLOGY

## 2024-06-21 PROCEDURE — 76821 MIDDLE CEREBRAL ARTERY ECHO: CPT | Performed by: OBSTETRICS & GYNECOLOGY

## 2024-06-21 NOTE — PROGRESS NOTES
Denies any vaginal bleeding, cramping, or leaking any fluids. Confirms positive fetal movement.   C/o some pressure in vaginal area, back and hips  
follow-up assessment in 3 days unless there is a clinical reason for her to return prior to that time. She is to call if she has any problems or questions prior to her next visit. Further evaluation and management will be dependent on her clinical presentation and the results of her testing.     --The patient was advised to call if she has any increased vaginal discharge, vaginal bleeding, contractions, abdominal pain, back pain or any new significant symptomatology prior to her next visit. I advised her that these are signs and symptoms of cervical change and require follow-up assessment when they occur.  Preeclampsia precautions were also reviewed with the patient.     --The patient was also counseled to call and/or return with any concerns for decreased fetal activity.    --The patient is to continue to follow with you in your office for ongoing obstetric care.    --The total time spent on today's visit was 25 minutes.  This included preparation for the visit (i.e. reviewing prior external notes and test results), performance of a medically appropriate history and examination, counseling, orders for medications, tests or other procedures, and coordination of care.  Greater than 50% of the time was spent face-to-face with the patient.  This time is exclusive of procedures performed.   I answered all of  the patient's questions to her satisfaction. I asked her to call if she had any additional questions prior to her next visit.      --At the conclusion of the visit, the patient appeared to have a good understanding of the issues discussed.  I answered all of her questions to her satisfaction. I asked her to call if she had any additional questions prior to her next visit.      --Thank you for allowing me to participate in the care of this pleasant patient.  Please don't hesitate to call me if you have any questions.      Sincerely,      Kindra Baig MD, FACOG [facility]

## 2024-06-25 ENCOUNTER — CLINICAL DOCUMENTATION (OUTPATIENT)
Dept: OBGYN CLINIC | Age: 34
End: 2024-06-25

## 2024-06-25 ENCOUNTER — ANESTHESIA EVENT (OUTPATIENT)
Dept: LABOR AND DELIVERY | Age: 34
End: 2024-06-25
Payer: COMMERCIAL

## 2024-06-25 ENCOUNTER — HOSPITAL ENCOUNTER (INPATIENT)
Age: 34
LOS: 3 days | Discharge: HOME | End: 2024-06-28
Attending: OBSTETRICS & GYNECOLOGY | Admitting: OBSTETRICS & GYNECOLOGY
Payer: COMMERCIAL

## 2024-06-25 ENCOUNTER — ROUTINE PRENATAL (OUTPATIENT)
Dept: OBGYN CLINIC | Age: 34
End: 2024-06-25
Payer: COMMERCIAL

## 2024-06-25 ENCOUNTER — ANESTHESIA (OUTPATIENT)
Dept: LABOR AND DELIVERY | Age: 34
End: 2024-06-25
Payer: COMMERCIAL

## 2024-06-25 ENCOUNTER — ANCILLARY PROCEDURE (OUTPATIENT)
Dept: OBGYN CLINIC | Age: 34
End: 2024-06-25
Payer: COMMERCIAL

## 2024-06-25 VITALS
SYSTOLIC BLOOD PRESSURE: 138 MMHG | DIASTOLIC BLOOD PRESSURE: 73 MMHG | WEIGHT: 293 LBS | HEART RATE: 77 BPM | BODY MASS INDEX: 49.81 KG/M2

## 2024-06-25 DIAGNOSIS — O28.8 NON-REACTIVE NST (NON-STRESS TEST): ICD-10-CM

## 2024-06-25 DIAGNOSIS — E66.01 CLASS 3 SEVERE OBESITY DUE TO EXCESS CALORIES WITHOUT SERIOUS COMORBIDITY WITH BODY MASS INDEX (BMI) OF 45.0 TO 49.9 IN ADULT (HCC): ICD-10-CM

## 2024-06-25 DIAGNOSIS — O41.03X0 OLIGOHYDRAMNIOS IN THIRD TRIMESTER, SINGLE OR UNSPECIFIED FETUS: ICD-10-CM

## 2024-06-25 DIAGNOSIS — G89.18 POST-OP PAIN: Primary | ICD-10-CM

## 2024-06-25 DIAGNOSIS — Z3A.37 37 WEEKS GESTATION OF PREGNANCY: Primary | ICD-10-CM

## 2024-06-25 DIAGNOSIS — O09.812 PREGNANCY RESULTING FROM IN VITRO FERTILIZATION IN SECOND TRIMESTER: ICD-10-CM

## 2024-06-25 LAB
ABO + RH BLD: NORMAL
ALBUMIN SERPL-MCNC: 3.5 G/DL (ref 3.5–5.2)
ALP SERPL-CCNC: 142 U/L (ref 35–104)
ALT SERPL-CCNC: 20 U/L (ref 0–32)
AMPHET UR QL SCN: NEGATIVE
ANION GAP SERPL CALCULATED.3IONS-SCNC: 12 MMOL/L (ref 7–16)
ARM BAND NUMBER: NORMAL
AST SERPL-CCNC: 14 U/L (ref 0–31)
BACTERIA URNS QL MICRO: ABNORMAL
BARBITURATES UR QL SCN: NEGATIVE
BASOPHILS # BLD: 0.04 K/UL (ref 0–0.2)
BASOPHILS NFR BLD: 0 % (ref 0–2)
BENZODIAZ UR QL: NEGATIVE
BILIRUB SERPL-MCNC: 0.2 MG/DL (ref 0–1.2)
BILIRUB UR QL STRIP: NEGATIVE
BLOOD BANK SAMPLE EXPIRATION: NORMAL
BLOOD GROUP ANTIBODIES SERPL: NEGATIVE
BUN SERPL-MCNC: 10 MG/DL (ref 6–20)
BUPRENORPHINE UR QL: NEGATIVE
CALCIUM SERPL-MCNC: 9.3 MG/DL (ref 8.6–10.2)
CANNABINOIDS UR QL SCN: NEGATIVE
CHLORIDE SERPL-SCNC: 104 MMOL/L (ref 98–107)
CLARITY UR: CLEAR
CO2 SERPL-SCNC: 21 MMOL/L (ref 22–29)
COCAINE UR QL SCN: NEGATIVE
COLOR UR: YELLOW
CREAT SERPL-MCNC: 0.6 MG/DL (ref 0.5–1)
CREAT UR-MCNC: 97.8 MG/DL (ref 29–226)
D-DIMER QUANTITATIVE: 378 NG/ML DDU (ref 0–230)
EOSINOPHIL # BLD: 0.36 K/UL (ref 0.05–0.5)
EOSINOPHILS RELATIVE PERCENT: 3 % (ref 0–6)
ERYTHROCYTE [DISTWIDTH] IN BLOOD BY AUTOMATED COUNT: 12.6 % (ref 11.5–15)
FENTANYL UR QL: NEGATIVE
FIBRINOGEN PPP-MCNC: 663 MG/DL (ref 200–400)
GFR, ESTIMATED: >90 ML/MIN/1.73M2
GLUCOSE SERPL-MCNC: 109 MG/DL (ref 74–99)
GLUCOSE UR STRIP-MCNC: NEGATIVE MG/DL
HCT VFR BLD AUTO: 37.4 % (ref 34–48)
HGB BLD-MCNC: 12.3 G/DL (ref 11.5–15.5)
HGB UR QL STRIP.AUTO: NEGATIVE
IMM GRANULOCYTES # BLD AUTO: 0.12 K/UL (ref 0–0.58)
IMM GRANULOCYTES NFR BLD: 1 % (ref 0–5)
INR PPP: 1
KETONES UR STRIP-MCNC: NEGATIVE MG/DL
LEUKOCYTE ESTERASE UR QL STRIP: ABNORMAL
LYMPHOCYTES NFR BLD: 1.98 K/UL (ref 1.5–4)
LYMPHOCYTES RELATIVE PERCENT: 16 % (ref 20–42)
MCH RBC QN AUTO: 29.6 PG (ref 26–35)
MCHC RBC AUTO-ENTMCNC: 32.9 G/DL (ref 32–34.5)
MCV RBC AUTO: 90.1 FL (ref 80–99.9)
METHADONE UR QL: NEGATIVE
MONOCYTES NFR BLD: 0.82 K/UL (ref 0.1–0.95)
MONOCYTES NFR BLD: 7 % (ref 2–12)
NEUTROPHILS NFR BLD: 73 % (ref 43–80)
NEUTS SEG NFR BLD: 8.99 K/UL (ref 1.8–7.3)
NITRITE UR QL STRIP: NEGATIVE
OPIATES UR QL SCN: NEGATIVE
OXYCODONE UR QL SCN: NEGATIVE
PARTIAL THROMBOPLASTIN TIME: 26.6 SEC (ref 24.5–35.1)
PCP UR QL SCN: NEGATIVE
PH UR STRIP: 7.5 [PH] (ref 5–9)
PLATELET # BLD AUTO: 333 K/UL (ref 130–450)
PMV BLD AUTO: 11.1 FL (ref 7–12)
POTASSIUM SERPL-SCNC: 4.1 MMOL/L (ref 3.5–5)
PROT SERPL-MCNC: 6.9 G/DL (ref 6.4–8.3)
PROT UR STRIP-MCNC: NEGATIVE MG/DL
PROTHROMBIN TIME: 11.2 SEC (ref 9.3–12.4)
RBC # BLD AUTO: 4.15 M/UL (ref 3.5–5.5)
RBC #/AREA URNS HPF: ABNORMAL /HPF
SODIUM SERPL-SCNC: 137 MMOL/L (ref 132–146)
SP GR UR STRIP: 1.02 (ref 1–1.03)
TEST INFORMATION: NORMAL
TOTAL PROTEIN, URINE: 13 MG/DL (ref 0–12)
URATE SERPL-MCNC: 5 MG/DL (ref 2.4–5.7)
URINE TOTAL PROTEIN CREATININE RATIO: 0.13 (ref 0–0.2)
UROBILINOGEN UR STRIP-ACNC: 0.2 EU/DL (ref 0–1)
WBC #/AREA URNS HPF: ABNORMAL /HPF
WBC OTHER # BLD: 12.3 K/UL (ref 4.5–11.5)

## 2024-06-25 PROCEDURE — 85610 PROTHROMBIN TIME: CPT

## 2024-06-25 PROCEDURE — 1220000001 HC SEMI PRIVATE L&D R&B

## 2024-06-25 PROCEDURE — 2580000003 HC RX 258: Performed by: ADVANCED PRACTICE MIDWIFE

## 2024-06-25 PROCEDURE — 84156 ASSAY OF PROTEIN URINE: CPT

## 2024-06-25 PROCEDURE — 76818 FETAL BIOPHYS PROFILE W/NST: CPT | Performed by: OBSTETRICS & GYNECOLOGY

## 2024-06-25 PROCEDURE — 86850 RBC ANTIBODY SCREEN: CPT

## 2024-06-25 PROCEDURE — 81001 URINALYSIS AUTO W/SCOPE: CPT

## 2024-06-25 PROCEDURE — 76816 OB US FOLLOW-UP PER FETUS: CPT | Performed by: OBSTETRICS & GYNECOLOGY

## 2024-06-25 PROCEDURE — 80053 COMPREHEN METABOLIC PANEL: CPT

## 2024-06-25 PROCEDURE — 84550 ASSAY OF BLOOD/URIC ACID: CPT

## 2024-06-25 PROCEDURE — 82570 ASSAY OF URINE CREATININE: CPT

## 2024-06-25 PROCEDURE — 86900 BLOOD TYPING SEROLOGIC ABO: CPT

## 2024-06-25 PROCEDURE — 6370000000 HC RX 637 (ALT 250 FOR IP): Performed by: OBSTETRICS & GYNECOLOGY

## 2024-06-25 PROCEDURE — 99213 OFFICE O/P EST LOW 20 MIN: CPT | Performed by: OBSTETRICS & GYNECOLOGY

## 2024-06-25 PROCEDURE — 85384 FIBRINOGEN ACTIVITY: CPT

## 2024-06-25 PROCEDURE — 76820 UMBILICAL ARTERY ECHO: CPT | Performed by: OBSTETRICS & GYNECOLOGY

## 2024-06-25 PROCEDURE — 99214 OFFICE O/P EST MOD 30 MIN: CPT | Performed by: OBSTETRICS & GYNECOLOGY

## 2024-06-25 PROCEDURE — 99221 1ST HOSP IP/OBS SF/LOW 40: CPT | Performed by: ADVANCED PRACTICE MIDWIFE

## 2024-06-25 PROCEDURE — 76821 MIDDLE CEREBRAL ARTERY ECHO: CPT | Performed by: OBSTETRICS & GYNECOLOGY

## 2024-06-25 PROCEDURE — 85730 THROMBOPLASTIN TIME PARTIAL: CPT

## 2024-06-25 PROCEDURE — 85025 COMPLETE CBC W/AUTO DIFF WBC: CPT

## 2024-06-25 PROCEDURE — 86901 BLOOD TYPING SEROLOGIC RH(D): CPT

## 2024-06-25 PROCEDURE — 85379 FIBRIN DEGRADATION QUANT: CPT

## 2024-06-25 PROCEDURE — 80307 DRUG TEST PRSMV CHEM ANLYZR: CPT

## 2024-06-25 RX ORDER — SODIUM CHLORIDE, SODIUM LACTATE, POTASSIUM CHLORIDE, CALCIUM CHLORIDE 600; 310; 30; 20 MG/100ML; MG/100ML; MG/100ML; MG/100ML
INJECTION, SOLUTION INTRAVENOUS CONTINUOUS
Status: DISCONTINUED | OUTPATIENT
Start: 2024-06-25 | End: 2024-06-27

## 2024-06-25 RX ORDER — PROMETHAZINE HYDROCHLORIDE 25 MG/ML
25 INJECTION, SOLUTION INTRAMUSCULAR; INTRAVENOUS
Status: DISCONTINUED | OUTPATIENT
Start: 2024-06-25 | End: 2024-06-27

## 2024-06-25 RX ORDER — MEPERIDINE HYDROCHLORIDE 25 MG/ML
25 INJECTION INTRAMUSCULAR; INTRAVENOUS; SUBCUTANEOUS
Status: DISCONTINUED | OUTPATIENT
Start: 2024-06-25 | End: 2024-06-26

## 2024-06-25 RX ADMIN — SODIUM CHLORIDE, POTASSIUM CHLORIDE, SODIUM LACTATE AND CALCIUM CHLORIDE: 600; 310; 30; 20 INJECTION, SOLUTION INTRAVENOUS at 17:17

## 2024-06-25 RX ADMIN — Medication 25 MCG: at 16:56

## 2024-06-25 RX ADMIN — Medication 25 MCG: at 12:54

## 2024-06-25 RX ADMIN — SODIUM CHLORIDE, POTASSIUM CHLORIDE, SODIUM LACTATE AND CALCIUM CHLORIDE: 600; 310; 30; 20 INJECTION, SOLUTION INTRAVENOUS at 10:45

## 2024-06-25 RX ADMIN — Medication 25 MCG: at 21:13

## 2024-06-25 NOTE — H&P
CHIEF COMPLAINT:  sent in from [facility] due to low fluid and bpp 2/10 for delivery.  Baby moving well no lof vb or pre- e sx, anxious so b/p up, rare ctx noted/    HISTORY OF PRESENT ILLNESS:      The patient is a 33 y.o. female at 37w5d.  OB History          1    Para        Term                AB        Living             SAB        IAB        Ectopic        Molar        Multiple        Live Births                Patient presents with a chief complaint as above and is being admitted for induction    Estimated Due Date: Estimated Date of Delivery: 24    PRENATAL CARE:    Complicated by: IVF, Obesity, carrier CF    PAST OB HISTORY  OB History          1    Para        Term                AB        Living             SAB        IAB        Ectopic        Molar        Multiple        Live Births                    Past Medical History:        Diagnosis Date    Chronic pain of both knees     Class 3 severe obesity due to excess calories without serious comorbidity with body mass index (BMI) of 45.0 to 49.9 in adult (HCC)     Infertility, female      Past Surgical History:        Procedure Laterality Date    FOOT SURGERY Left     foreign body removal (long sewing needle)     Allergies:  Patient has no known allergies.  Social History:    Social History     Socioeconomic History    Marital status:      Spouse name: Not on file    Number of children: Not on file    Years of education: Not on file    Highest education level: Not on file   Occupational History    Not on file   Tobacco Use    Smoking status: Never    Smokeless tobacco: Never   Vaping Use    Vaping Use: Never used   Substance and Sexual Activity    Alcohol use: Not Currently    Drug use: Never    Sexual activity: Yes     Partners: Male   Other Topics Concern    Not on file   Social History Narrative    Mom  Sindy Pisano     Social Determinants of Health     Financial Resource Strain: Not on file   Food Insecurity: No

## 2024-06-25 NOTE — PROGRESS NOTES
Assumed patient care. Patient resting comfortably in bed at this time. Denies any complaints or concerns. States +FM. Denies any vb or lof

## 2024-06-25 NOTE — PROGRESS NOTES
2024      Zev Cadena MD  [address] [place] [address]     RE:  CARLOS KITCHEN  : 1990   AGE: 33 y.o.    This report has been created using voice recognition software. It may contain errors which are inherent in voice recognition technology.    Dear Dr. Cadena:      I had the pleasure of meeting with Ms. Kitchen for a return consultation.  As you know, Ms. Kitchen  is a 33 y.o.  at 37w5d (IVF) who is being followed by our office for multiple medical issues.      Today, Ms. Kitchen reports that she feels well.  She notes good fetal movement and denies any symptoms of leaking of fluid, vaginal bleeding, and/or contractions.  She had a fetal ultrasound that was notable for the following.  There is a single intrauterine gestation in a cephalic presentation with a heart rate of 128 beats per minute.  The placenta is posterior.  The amniotic fluid index is 5 cm.  The composite gestational age is 35w6d.  The estimated fetal weight is at the 41st percentile.   BPP 2/10.  Only intermittent fetal breathing noted.  A 2 x 2 centimeter pocket of fluid was not seen.  Umbilical artery Doppler studies are normal.  MCA Dopplers attempted but unable to be obtained secondary to fetal position.      PERTINENT PHYSICAL EXAMINATION:   /73   Pulse 77   Wt (!) 144.2 kg (318 lb)   BMI 49.81 kg/m²     Urine dipstick:   No urine sample      A fetal ultrasound assessment was performed today. A report is enclosed for your review.    Assessment & Plan:  33 y.o.  at 37w5d (IVF) with:    1.  Genetic counseling -- The patient was previously counseled regarding her options for genetic screening and/or diagnostic testing.  Counseling was reviewed.     The patient had that preimplantation genetic testing with her IVF.     The patient's prenatal record was reviewed.  She completed a expanded carrier panel.  She was noted to be a carrier for cystic fibrosis and MCAD.  Her partner was

## 2024-06-25 NOTE — PROGRESS NOTES
Denies any vaginal bleeding, cramping, or leaking any fluids. Confirms positive fetal movement.   No urine done today, patient states she does them every Friday when she's here since she comes twice weekly

## 2024-06-25 NOTE — ANESTHESIA PRE PROCEDURE
Department of Anesthesiology  Preprocedure Note       Name:  Davina Kitchen   Age:  33 y.o.  :  1990                                          MRN:  [id number]         Date:  2024      Surgeon: * No surgeons listed *    Procedure: * No procedures listed *    Medications prior to admission:   Prior to Admission medications    Medication Sig Start Date End Date Taking? Authorizing Provider   progesterone (PROMETRIUM) 200 MG CAPS capsule Place 1 capsule into the vagina at bedtime  Patient not taking: Reported on 2024   Kindra Baig MD   vitamin B-12 (CYANOCOBALAMIN) 1000 MCG tablet Take 1 tablet by mouth daily 3/14/24   Kindra Baig MD   aspirin 81 MG EC tablet Take 1 tablet by mouth daily 24   Kindra Baig MD   Cholecalciferol (VITAMIN D3) 50 MCG (2000) CAPS Take 1 capsule by mouth daily    ProviderAislinn MD   Prenatal Vit-Fe Fumarate-FA (PRENATAL VITAMINS PO) Take 1 tablet by mouth daily    Provider, MD Aislinn       Current medications:    No current facility-administered medications for this encounter.       Allergies:  No Known Allergies    Problem List:    Patient Active Problem List   Diagnosis Code    Class 3 severe obesity due to excess calories without serious comorbidity with body mass index (BMI) of 45.0 to 49.9 in adult ([facility]) E66.01, Z68.42    Chronic pain of both knees M25.561, M25.562, G89.29    Unspecified symptoms and signs involving the genitourinary system R39.9    Nausea/vomiting in pregnancy O21.9    History of infertility Z87.42    Pregnancy resulting from in vitro fertilization in second trimester O09.812    BMI 45.0-49.9, adult ([facility]) Z68.42    Low-lying placenta O44.40    Low vitamin B12 level R79.89    Short cervix affecting pregnancy O26.879    Urine protein increased R80.9    Elevated LFTs R79.89    Nutritional deficiency E63.9    Maternal morbid obesity in third trimester, antepartum ([facility]) O99.213, E66.01    Abnormal placenta

## 2024-06-25 NOTE — PROGRESS NOTES
MFM note    Patient is a 33-year-old  1 para 0 currently at 37 weeks 5 days (IVF) who is followed by maternal-fetal medicine secondary to having an IVF pregnancy, a history of infertility, BMI 49, PCOS, nutritional deficiencies, hypothyroxinemia, cervical shortening, and abnormal placental function testing.    The patient presented today for follow-up visit.  She did not have any complaints.  She noted fetal movement yesterday.  She did not note much fetal movement this morning.  She denies having any leaking of fluid or vaginal bleeding.  She denied having any contractions.    An ultrasound was performed.  There is a single intrauterine gestation in a cephalic presentation with a heart rate of 128 bpm.  The placenta is posterior.  MARISEL was decreased to 5 cm consistent with oligohydramnios.  No 2 x 2 centimeter pocket of fluid was seen.  Biophysical profile score was 2/10 with points awarded for fetal breathing.  The composite gestational age was 35 weeks 6 days.  The estimated fetal weight was 6 pounds 6 ounces, 41st percentile.  The umbilical artery PI was normal.  MCA Dopplers were attempted but unable to be obtained.    The ultrasound findings were reviewed with the patient.  Secondary to the nonreassuring fetal testing at 37 weeks 5 days, the recommendation was made to proceed with delivery.  Risks and benefits of expectant management versus delivery were reviewed with the patient.  She was agreeable with the plan.    The patient was sent to the hospital following today's visit for admission.  She was counseled to remain NPO.    I would defer the mode of delivery to the patient's referring provider, Dr. Cadena.     Also of note, the patient had an IVF pregnancy.  She declined having a fetal echocardiogram.  -- This information should be relayed to pediatrics.  Views of the heart were normal but limited throughout the pregnancy.  I would defer any additional  evaluation to pediatrics.    Nursing

## 2024-06-26 PROCEDURE — 2709999900 HC NON-CHARGEABLE SUPPLY: Performed by: OBSTETRICS & GYNECOLOGY

## 2024-06-26 PROCEDURE — 3700000001 HC ADD 15 MINUTES (ANESTHESIA): Performed by: OBSTETRICS & GYNECOLOGY

## 2024-06-26 PROCEDURE — 6360000002 HC RX W HCPCS: Performed by: OBSTETRICS & GYNECOLOGY

## 2024-06-26 PROCEDURE — 3609079900 HC CESAREAN SECTION: Performed by: OBSTETRICS & GYNECOLOGY

## 2024-06-26 PROCEDURE — 59514 CESAREAN DELIVERY ONLY: CPT

## 2024-06-26 PROCEDURE — 6360000002 HC RX W HCPCS: Performed by: NURSE ANESTHETIST, CERTIFIED REGISTERED

## 2024-06-26 PROCEDURE — 3700000000 HC ANESTHESIA ATTENDED CARE: Performed by: OBSTETRICS & GYNECOLOGY

## 2024-06-26 PROCEDURE — 2580000003 HC RX 258: Performed by: ADVANCED PRACTICE MIDWIFE

## 2024-06-26 PROCEDURE — 2500000003 HC RX 250 WO HCPCS: Performed by: NURSE ANESTHETIST, CERTIFIED REGISTERED

## 2024-06-26 PROCEDURE — 1220000001 HC SEMI PRIVATE L&D R&B

## 2024-06-26 PROCEDURE — 3E033VJ INTRODUCTION OF OTHER HORMONE INTO PERIPHERAL VEIN, PERCUTANEOUS APPROACH: ICD-10-PCS | Performed by: OBSTETRICS & GYNECOLOGY

## 2024-06-26 PROCEDURE — 6370000000 HC RX 637 (ALT 250 FOR IP): Performed by: OBSTETRICS & GYNECOLOGY

## 2024-06-26 PROCEDURE — 6370000000 HC RX 637 (ALT 250 FOR IP)

## 2024-06-26 PROCEDURE — 2580000003 HC RX 258: Performed by: NURSE ANESTHETIST, CERTIFIED REGISTERED

## 2024-06-26 PROCEDURE — 6360000002 HC RX W HCPCS

## 2024-06-26 PROCEDURE — 7100000000 HC PACU RECOVERY - FIRST 15 MIN: Performed by: OBSTETRICS & GYNECOLOGY

## 2024-06-26 PROCEDURE — 7100000001 HC PACU RECOVERY - ADDTL 15 MIN: Performed by: OBSTETRICS & GYNECOLOGY

## 2024-06-26 RX ORDER — ACETAMINOPHEN 325 MG/1
650 TABLET ORAL EVERY 4 HOURS PRN
Status: DISCONTINUED | OUTPATIENT
Start: 2024-06-26 | End: 2024-06-27

## 2024-06-26 RX ORDER — KETOROLAC TROMETHAMINE 30 MG/ML
30 INJECTION, SOLUTION INTRAMUSCULAR; INTRAVENOUS EVERY 6 HOURS PRN
Status: DISCONTINUED | OUTPATIENT
Start: 2024-06-26 | End: 2024-06-27

## 2024-06-26 RX ORDER — MEPERIDINE HYDROCHLORIDE 25 MG/ML
12.5 INJECTION INTRAMUSCULAR; INTRAVENOUS; SUBCUTANEOUS EVERY 5 MIN PRN
Status: DISCONTINUED | OUTPATIENT
Start: 2024-06-26 | End: 2024-06-27

## 2024-06-26 RX ORDER — CITRIC ACID/SODIUM CITRATE 334-500MG
30 SOLUTION, ORAL ORAL ONCE
Status: COMPLETED | OUTPATIENT
Start: 2024-06-26 | End: 2024-06-26

## 2024-06-26 RX ORDER — SODIUM CHLORIDE, SODIUM LACTATE, POTASSIUM CHLORIDE, CALCIUM CHLORIDE 600; 310; 30; 20 MG/100ML; MG/100ML; MG/100ML; MG/100ML
INJECTION, SOLUTION INTRAVENOUS CONTINUOUS PRN
Status: DISCONTINUED | OUTPATIENT
Start: 2024-06-26 | End: 2024-06-26 | Stop reason: SDUPTHER

## 2024-06-26 RX ORDER — ONDANSETRON 2 MG/ML
4 INJECTION INTRAMUSCULAR; INTRAVENOUS EVERY 6 HOURS PRN
Status: DISCONTINUED | OUTPATIENT
Start: 2024-06-26 | End: 2024-06-27

## 2024-06-26 RX ORDER — CEFAZOLIN 3 G/1
INJECTION, POWDER, FOR SOLUTION INTRAVENOUS
Status: COMPLETED
Start: 2024-06-26 | End: 2024-06-26

## 2024-06-26 RX ORDER — SODIUM CHLORIDE, SODIUM LACTATE, POTASSIUM CHLORIDE, CALCIUM CHLORIDE 600; 310; 30; 20 MG/100ML; MG/100ML; MG/100ML; MG/100ML
INJECTION, SOLUTION INTRAVENOUS CONTINUOUS
Status: DISCONTINUED | OUTPATIENT
Start: 2024-06-26 | End: 2024-06-27

## 2024-06-26 RX ORDER — MIDAZOLAM HYDROCHLORIDE 2 MG/2ML
2 INJECTION, SOLUTION INTRAMUSCULAR; INTRAVENOUS
Status: DISCONTINUED | OUTPATIENT
Start: 2024-06-26 | End: 2024-06-27

## 2024-06-26 RX ORDER — MEPERIDINE HYDROCHLORIDE 25 MG/ML
25 INJECTION INTRAMUSCULAR; INTRAVENOUS; SUBCUTANEOUS
Status: DISCONTINUED | OUTPATIENT
Start: 2024-06-26 | End: 2024-06-27

## 2024-06-26 RX ORDER — SODIUM CHLORIDE 0.9 % (FLUSH) 0.9 %
10 SYRINGE (ML) INJECTION PRN
Status: DISCONTINUED | OUTPATIENT
Start: 2024-06-26 | End: 2024-06-27

## 2024-06-26 RX ORDER — SODIUM CHLORIDE 0.9 % (FLUSH) 0.9 %
5-40 SYRINGE (ML) INJECTION EVERY 12 HOURS SCHEDULED
Status: DISCONTINUED | OUTPATIENT
Start: 2024-06-26 | End: 2024-06-27

## 2024-06-26 RX ORDER — SODIUM CHLORIDE, SODIUM LACTATE, POTASSIUM CHLORIDE, AND CALCIUM CHLORIDE .6; .31; .03; .02 G/100ML; G/100ML; G/100ML; G/100ML
1000 INJECTION, SOLUTION INTRAVENOUS ONCE
Status: DISCONTINUED | OUTPATIENT
Start: 2024-06-26 | End: 2024-06-27

## 2024-06-26 RX ORDER — EPHEDRINE SULFATE/0.9% NACL/PF 50 MG/5 ML
10 SYRINGE (ML) INTRAVENOUS
Status: DISCONTINUED | OUTPATIENT
Start: 2024-06-26 | End: 2024-06-27

## 2024-06-26 RX ORDER — FENTANYL CITRATE 50 UG/ML
INJECTION, SOLUTION INTRAMUSCULAR; INTRAVENOUS PRN
Status: DISCONTINUED | OUTPATIENT
Start: 2024-06-26 | End: 2024-06-26 | Stop reason: SDUPTHER

## 2024-06-26 RX ORDER — EPHEDRINE SULFATE/0.9% NACL/PF 50 MG/5 ML
5 SYRINGE (ML) INTRAVENOUS PRN
Status: DISCONTINUED | OUTPATIENT
Start: 2024-06-27 | End: 2024-06-27

## 2024-06-26 RX ORDER — DIPHENHYDRAMINE HYDROCHLORIDE 50 MG/ML
INJECTION INTRAMUSCULAR; INTRAVENOUS PRN
Status: DISCONTINUED | OUTPATIENT
Start: 2024-06-26 | End: 2024-06-26 | Stop reason: SDUPTHER

## 2024-06-26 RX ORDER — NALOXONE HYDROCHLORIDE 0.4 MG/ML
INJECTION, SOLUTION INTRAMUSCULAR; INTRAVENOUS; SUBCUTANEOUS PRN
Status: DISCONTINUED | OUTPATIENT
Start: 2024-06-26 | End: 2024-06-27

## 2024-06-26 RX ORDER — LIDOCAINE HYDROCHLORIDE 10 MG/ML
INJECTION, SOLUTION INFILTRATION; PERINEURAL
Status: DISPENSED
Start: 2024-06-26 | End: 2024-06-26

## 2024-06-26 RX ORDER — SODIUM CHLORIDE 0.9 % (FLUSH) 0.9 %
5-40 SYRINGE (ML) INJECTION PRN
Status: DISCONTINUED | OUTPATIENT
Start: 2024-06-26 | End: 2024-06-27

## 2024-06-26 RX ORDER — DIPHENHYDRAMINE HYDROCHLORIDE 50 MG/ML
25 INJECTION INTRAMUSCULAR; INTRAVENOUS EVERY 6 HOURS PRN
Status: DISCONTINUED | OUTPATIENT
Start: 2024-06-26 | End: 2024-06-27

## 2024-06-26 RX ORDER — SODIUM CHLORIDE 9 MG/ML
INJECTION, SOLUTION INTRAVENOUS PRN
Status: DISCONTINUED | OUTPATIENT
Start: 2024-06-26 | End: 2024-06-27

## 2024-06-26 RX ORDER — DEXAMETHASONE SODIUM PHOSPHATE 4 MG/ML
INJECTION, SOLUTION INTRA-ARTICULAR; INTRALESIONAL; INTRAMUSCULAR; INTRAVENOUS; SOFT TISSUE PRN
Status: DISCONTINUED | OUTPATIENT
Start: 2024-06-26 | End: 2024-06-26 | Stop reason: SDUPTHER

## 2024-06-26 RX ORDER — DEXMEDETOMIDINE HYDROCHLORIDE 100 UG/ML
INJECTION, SOLUTION INTRAVENOUS PRN
Status: DISCONTINUED | OUTPATIENT
Start: 2024-06-26 | End: 2024-06-26 | Stop reason: SDUPTHER

## 2024-06-26 RX ORDER — ACETAMINOPHEN 650 MG
TABLET, EXTENDED RELEASE ORAL
Status: DISPENSED
Start: 2024-06-26 | End: 2024-06-26

## 2024-06-26 RX ORDER — OXYCODONE HYDROCHLORIDE 5 MG/1
10 TABLET ORAL EVERY 4 HOURS PRN
Status: DISCONTINUED | OUTPATIENT
Start: 2024-06-26 | End: 2024-06-27

## 2024-06-26 RX ORDER — ONDANSETRON 2 MG/ML
INJECTION INTRAMUSCULAR; INTRAVENOUS PRN
Status: DISCONTINUED | OUTPATIENT
Start: 2024-06-26 | End: 2024-06-26 | Stop reason: SDUPTHER

## 2024-06-26 RX ORDER — BUPIVACAINE HYDROCHLORIDE 7.5 MG/ML
INJECTION, SOLUTION INTRASPINAL PRN
Status: DISCONTINUED | OUTPATIENT
Start: 2024-06-26 | End: 2024-06-26 | Stop reason: SDUPTHER

## 2024-06-26 RX ORDER — ONDANSETRON 2 MG/ML
4 INJECTION INTRAMUSCULAR; INTRAVENOUS
Status: DISCONTINUED | OUTPATIENT
Start: 2024-06-26 | End: 2024-06-27

## 2024-06-26 RX ORDER — DIPHENHYDRAMINE HCL 25 MG
25 TABLET ORAL EVERY 6 HOURS PRN
Status: DISCONTINUED | OUTPATIENT
Start: 2024-06-26 | End: 2024-06-27

## 2024-06-26 RX ORDER — OXYCODONE HYDROCHLORIDE 5 MG/1
5 TABLET ORAL EVERY 4 HOURS PRN
Status: DISCONTINUED | OUTPATIENT
Start: 2024-06-26 | End: 2024-06-27

## 2024-06-26 RX ORDER — IPRATROPIUM BROMIDE AND ALBUTEROL SULFATE 2.5; .5 MG/3ML; MG/3ML
1 SOLUTION RESPIRATORY (INHALATION)
Status: DISCONTINUED | OUTPATIENT
Start: 2024-06-26 | End: 2024-06-27

## 2024-06-26 RX ORDER — CITRIC ACID/SODIUM CITRATE 334-500MG
SOLUTION, ORAL ORAL
Status: COMPLETED
Start: 2024-06-26 | End: 2024-06-26

## 2024-06-26 RX ORDER — METHYLERGONOVINE MALEATE 0.2 MG/ML
INJECTION INTRAVENOUS PRN
Status: DISCONTINUED | OUTPATIENT
Start: 2024-06-26 | End: 2024-06-26

## 2024-06-26 RX ORDER — PHENYLEPHRINE HCL IN 0.9% NACL 1 MG/10 ML
SYRINGE (ML) INTRAVENOUS PRN
Status: DISCONTINUED | OUTPATIENT
Start: 2024-06-26 | End: 2024-06-26 | Stop reason: SDUPTHER

## 2024-06-26 RX ORDER — WATER 10 ML/10ML
INJECTION INTRAMUSCULAR; INTRAVENOUS; SUBCUTANEOUS
Status: DISCONTINUED
Start: 2024-06-26 | End: 2024-06-27

## 2024-06-26 RX ORDER — METHYLERGONOVINE MALEATE 0.2 MG/ML
INJECTION INTRAVENOUS PRN
Status: DISCONTINUED | OUTPATIENT
Start: 2024-06-26 | End: 2024-06-26 | Stop reason: SDUPTHER

## 2024-06-26 RX ADMIN — Medication 30 ML: at 19:50

## 2024-06-26 RX ADMIN — METHYLERGONOVINE MALEATE 200 MCG: 0.2 INJECTION, SOLUTION INTRAMUSCULAR; INTRAVENOUS at 21:23

## 2024-06-26 RX ADMIN — Medication 100 MCG: at 20:25

## 2024-06-26 RX ADMIN — DEXMEDETOMIDINE 10 MCG: 100 INJECTION, SOLUTION INTRAVENOUS at 20:15

## 2024-06-26 RX ADMIN — MEPERIDINE HYDROCHLORIDE 25 MG: 25 INJECTION INTRAMUSCULAR; INTRAVENOUS; SUBCUTANEOUS at 13:57

## 2024-06-26 RX ADMIN — SODIUM CHLORIDE, POTASSIUM CHLORIDE, SODIUM LACTATE AND CALCIUM CHLORIDE: 600; 310; 30; 20 INJECTION, SOLUTION INTRAVENOUS at 20:11

## 2024-06-26 RX ADMIN — SODIUM CHLORIDE, POTASSIUM CHLORIDE, SODIUM LACTATE AND CALCIUM CHLORIDE: 600; 310; 30; 20 INJECTION, SOLUTION INTRAVENOUS at 09:38

## 2024-06-26 RX ADMIN — SODIUM CITRATE AND CITRIC ACID MONOHYDRATE 30 ML: 500; 334 SOLUTION ORAL at 19:50

## 2024-06-26 RX ADMIN — Medication 909 ML/HR: at 20:52

## 2024-06-26 RX ADMIN — CEFAZOLIN 3000 MG: 3 INJECTION, POWDER, FOR SOLUTION INTRAVENOUS at 19:51

## 2024-06-26 RX ADMIN — Medication 25 MCG: at 01:11

## 2024-06-26 RX ADMIN — DIPHENHYDRAMINE HYDROCHLORIDE 25 MG: 50 INJECTION INTRAMUSCULAR; INTRAVENOUS at 20:54

## 2024-06-26 RX ADMIN — DEXAMETHASONE SODIUM PHOSPHATE 4 MG: 4 INJECTION, SOLUTION INTRAMUSCULAR; INTRAVENOUS at 20:51

## 2024-06-26 RX ADMIN — FENTANYL CITRATE 25 MCG: 50 INJECTION, SOLUTION INTRAMUSCULAR; INTRAVENOUS at 20:22

## 2024-06-26 RX ADMIN — ONDANSETRON 8 MG: 2 INJECTION INTRAMUSCULAR; INTRAVENOUS at 20:15

## 2024-06-26 RX ADMIN — BUPIVACAINE HYDROCHLORIDE 1.6 ML: 7.5 INJECTION, SOLUTION SUBARACHNOID at 20:22

## 2024-06-26 RX ADMIN — Medication 1 MILLI-UNITS/MIN: at 09:06

## 2024-06-26 NOTE — PROGRESS NOTES
Spoke with dietary, unable to see diet order, states nurse must delete order and replace it in a few mins.

## 2024-06-26 NOTE — PROGRESS NOTES
RN and house officer at bedside, patient AROM, FSE and IUPC placed. Clear fluid with scan amount of bloody show. Tolerated well. Positioned semi fowlers in bed, support person at bedside, call light within reach.

## 2024-06-26 NOTE — PROGRESS NOTES
Spoke with Dr. Cadena, notified of sve 3/70/-2, notified that patient hasn't requested pain meds or epidural as of yet.   States patient can shower and eat breakfast and pitocin can start after that.   Also demerol route clarified,   New orders noted.

## 2024-06-26 NOTE — PROGRESS NOTES
Dr. Cadena at bedside. Ordered to complete 6 doses of cytotec q 4 over 24 H. Orders for demerol and phenergan received

## 2024-06-26 NOTE — PROGRESS NOTES
Dr. Cadena at bedside. FSE replaced. 4cm with molding. Discussed possibility of  with patient. Will give more time to progress, but can prep for  in case.

## 2024-06-26 NOTE — PROGRESS NOTES
Assumed care of pt. Pt up in chair. C/o mild pain. Talked pt through procedure. Ot denies questions.fhr reactive.

## 2024-06-26 NOTE — PROGRESS NOTES
Updated Dr. Cadena on tracing. Patient was on the nayeli monitor. Appeared to be having decelerations in tracing. External US applied, and tracing has baseline 130 with moderate variability and accelerations. Requesting internal monitors. Orders received.

## 2024-06-27 LAB
ERYTHROCYTE [DISTWIDTH] IN BLOOD BY AUTOMATED COUNT: 12.6 % (ref 11.5–15)
HCT VFR BLD AUTO: 28.8 % (ref 34–48)
HGB BLD-MCNC: 9.5 G/DL (ref 11.5–15.5)
MCH RBC QN AUTO: 29.8 PG (ref 26–35)
MCHC RBC AUTO-ENTMCNC: 33 G/DL (ref 32–34.5)
MCV RBC AUTO: 90.3 FL (ref 80–99.9)
PLATELET # BLD AUTO: 288 K/UL (ref 130–450)
PMV BLD AUTO: 11 FL (ref 7–12)
RBC # BLD AUTO: 3.19 M/UL (ref 3.5–5.5)
WBC OTHER # BLD: 16.8 K/UL (ref 4.5–11.5)

## 2024-06-27 PROCEDURE — 1220000000 HC SEMI PRIVATE OB R&B

## 2024-06-27 PROCEDURE — 85027 COMPLETE CBC AUTOMATED: CPT

## 2024-06-27 PROCEDURE — 6370000000 HC RX 637 (ALT 250 FOR IP): Performed by: OBSTETRICS & GYNECOLOGY

## 2024-06-27 PROCEDURE — 6370000000 HC RX 637 (ALT 250 FOR IP): Performed by: ANESTHESIOLOGY

## 2024-06-27 PROCEDURE — 36415 COLL VENOUS BLD VENIPUNCTURE: CPT

## 2024-06-27 PROCEDURE — 6360000002 HC RX W HCPCS: Performed by: OBSTETRICS & GYNECOLOGY

## 2024-06-27 PROCEDURE — 2580000003 HC RX 258: Performed by: OBSTETRICS & GYNECOLOGY

## 2024-06-27 RX ORDER — DIPHENHYDRAMINE HYDROCHLORIDE 50 MG/ML
25 INJECTION INTRAMUSCULAR; INTRAVENOUS EVERY 6 HOURS PRN
Status: DISCONTINUED | OUTPATIENT
Start: 2024-06-27 | End: 2024-06-28 | Stop reason: HOSPADM

## 2024-06-27 RX ORDER — SODIUM CHLORIDE, SODIUM LACTATE, POTASSIUM CHLORIDE, CALCIUM CHLORIDE 600; 310; 30; 20 MG/100ML; MG/100ML; MG/100ML; MG/100ML
INJECTION, SOLUTION INTRAVENOUS CONTINUOUS
Status: DISCONTINUED | OUTPATIENT
Start: 2024-06-27 | End: 2024-06-28 | Stop reason: HOSPADM

## 2024-06-27 RX ORDER — ASPIRIN 81 MG/1
81 TABLET ORAL DAILY
Status: DISCONTINUED | OUTPATIENT
Start: 2024-06-27 | End: 2024-06-28 | Stop reason: HOSPADM

## 2024-06-27 RX ORDER — METRONIDAZOLE 500 MG/1
500 TABLET ORAL EVERY 8 HOURS SCHEDULED
Status: DISCONTINUED | OUTPATIENT
Start: 2024-06-27 | End: 2024-06-28 | Stop reason: HOSPADM

## 2024-06-27 RX ORDER — PRENATAL WITH FERROUS FUM AND FOLIC ACID 3080; 920; 120; 400; 22; 1.84; 3; 20; 10; 1; 12; 200; 27; 25; 2 [IU]/1; [IU]/1; MG/1; [IU]/1; MG/1; MG/1; MG/1; MG/1; MG/1; MG/1; UG/1; MG/1; MG/1; MG/1; MG/1
1 TABLET ORAL DAILY
Status: DISCONTINUED | OUTPATIENT
Start: 2024-06-27 | End: 2024-06-28 | Stop reason: HOSPADM

## 2024-06-27 RX ORDER — ONDANSETRON 4 MG/1
4 TABLET, ORALLY DISINTEGRATING ORAL EVERY 8 HOURS PRN
Status: DISCONTINUED | OUTPATIENT
Start: 2024-06-27 | End: 2024-06-28 | Stop reason: HOSPADM

## 2024-06-27 RX ORDER — FERROUS SULFATE 325(65) MG
325 TABLET ORAL EVERY OTHER DAY
Status: DISCONTINUED | OUTPATIENT
Start: 2024-06-27 | End: 2024-06-28 | Stop reason: HOSPADM

## 2024-06-27 RX ORDER — IBUPROFEN 800 MG/1
800 TABLET ORAL EVERY 8 HOURS
Status: DISCONTINUED | OUTPATIENT
Start: 2024-06-28 | End: 2024-06-28 | Stop reason: HOSPADM

## 2024-06-27 RX ORDER — ACETAMINOPHEN 500 MG
1000 TABLET ORAL EVERY 8 HOURS SCHEDULED
Status: DISCONTINUED | OUTPATIENT
Start: 2024-06-27 | End: 2024-06-28 | Stop reason: HOSPADM

## 2024-06-27 RX ORDER — DOCUSATE SODIUM 100 MG/1
100 CAPSULE, LIQUID FILLED ORAL 2 TIMES DAILY
Status: DISCONTINUED | OUTPATIENT
Start: 2024-06-27 | End: 2024-06-28 | Stop reason: HOSPADM

## 2024-06-27 RX ORDER — ONDANSETRON 2 MG/ML
4 INJECTION INTRAMUSCULAR; INTRAVENOUS EVERY 6 HOURS PRN
Status: DISCONTINUED | OUTPATIENT
Start: 2024-06-27 | End: 2024-06-28 | Stop reason: HOSPADM

## 2024-06-27 RX ORDER — MORPHINE SULFATE 4 MG/ML
4 INJECTION, SOLUTION INTRAMUSCULAR; INTRAVENOUS
Status: DISCONTINUED | OUTPATIENT
Start: 2024-06-27 | End: 2024-06-28 | Stop reason: HOSPADM

## 2024-06-27 RX ORDER — MORPHINE SULFATE 2 MG/ML
2 INJECTION, SOLUTION INTRAMUSCULAR; INTRAVENOUS
Status: DISCONTINUED | OUTPATIENT
Start: 2024-06-27 | End: 2024-06-28 | Stop reason: HOSPADM

## 2024-06-27 RX ORDER — DIPHENHYDRAMINE HCL 25 MG
25 TABLET ORAL EVERY 6 HOURS PRN
Status: DISCONTINUED | OUTPATIENT
Start: 2024-06-27 | End: 2024-06-28 | Stop reason: HOSPADM

## 2024-06-27 RX ORDER — BISACODYL 10 MG
10 SUPPOSITORY, RECTAL RECTAL DAILY PRN
Status: DISCONTINUED | OUTPATIENT
Start: 2024-06-27 | End: 2024-06-28 | Stop reason: HOSPADM

## 2024-06-27 RX ORDER — SODIUM CHLORIDE 9 MG/ML
INJECTION, SOLUTION INTRAVENOUS PRN
Status: DISCONTINUED | OUTPATIENT
Start: 2024-06-27 | End: 2024-06-28 | Stop reason: HOSPADM

## 2024-06-27 RX ORDER — MODIFIED LANOLIN
OINTMENT (GRAM) TOPICAL
Status: DISCONTINUED | OUTPATIENT
Start: 2024-06-27 | End: 2024-06-28 | Stop reason: HOSPADM

## 2024-06-27 RX ORDER — SODIUM CHLORIDE 0.9 % (FLUSH) 0.9 %
5-40 SYRINGE (ML) INJECTION PRN
Status: DISCONTINUED | OUTPATIENT
Start: 2024-06-27 | End: 2024-06-28 | Stop reason: HOSPADM

## 2024-06-27 RX ORDER — OXYCODONE HYDROCHLORIDE 5 MG/1
5 TABLET ORAL EVERY 4 HOURS PRN
Status: DISCONTINUED | OUTPATIENT
Start: 2024-06-27 | End: 2024-06-28 | Stop reason: HOSPADM

## 2024-06-27 RX ORDER — CEPHALEXIN 500 MG/1
500 CAPSULE ORAL EVERY 8 HOURS SCHEDULED
Status: DISCONTINUED | OUTPATIENT
Start: 2024-06-27 | End: 2024-06-28 | Stop reason: HOSPADM

## 2024-06-27 RX ORDER — SIMETHICONE 80 MG
80 TABLET,CHEWABLE ORAL EVERY 6 HOURS PRN
Status: DISCONTINUED | OUTPATIENT
Start: 2024-06-27 | End: 2024-06-28 | Stop reason: HOSPADM

## 2024-06-27 RX ORDER — KETOROLAC TROMETHAMINE 30 MG/ML
30 INJECTION, SOLUTION INTRAMUSCULAR; INTRAVENOUS EVERY 6 HOURS
Status: DISPENSED | OUTPATIENT
Start: 2024-06-27 | End: 2024-06-28

## 2024-06-27 RX ORDER — SODIUM CHLORIDE 0.9 % (FLUSH) 0.9 %
5-40 SYRINGE (ML) INJECTION EVERY 12 HOURS SCHEDULED
Status: DISCONTINUED | OUTPATIENT
Start: 2024-06-27 | End: 2024-06-28 | Stop reason: HOSPADM

## 2024-06-27 RX ORDER — OXYCODONE HYDROCHLORIDE 5 MG/1
10 TABLET ORAL EVERY 4 HOURS PRN
Status: DISCONTINUED | OUTPATIENT
Start: 2024-06-27 | End: 2024-06-28 | Stop reason: HOSPADM

## 2024-06-27 RX ORDER — PANTOPRAZOLE SODIUM 40 MG/1
40 TABLET, DELAYED RELEASE ORAL
Status: DISCONTINUED | OUTPATIENT
Start: 2024-06-27 | End: 2024-06-28 | Stop reason: HOSPADM

## 2024-06-27 RX ADMIN — METRONIDAZOLE 500 MG: 500 TABLET ORAL at 13:47

## 2024-06-27 RX ADMIN — ACETAMINOPHEN 1000 MG: 500 TABLET ORAL at 20:28

## 2024-06-27 RX ADMIN — FERROUS SULFATE TAB 325 MG (65 MG ELEMENTAL FE) 325 MG: 325 (65 FE) TAB at 09:30

## 2024-06-27 RX ADMIN — CEPHALEXIN 500 MG: 500 CAPSULE ORAL at 06:08

## 2024-06-27 RX ADMIN — PRENATAL WITH FERROUS FUM AND FOLIC ACID 1 TABLET: 3080; 920; 120; 400; 22; 1.84; 3; 20; 10; 1; 12; 200; 27; 25; 2 TABLET ORAL at 09:30

## 2024-06-27 RX ADMIN — CEPHALEXIN 500 MG: 500 CAPSULE ORAL at 20:28

## 2024-06-27 RX ADMIN — SIMETHICONE 80 MG: 80 TABLET, CHEWABLE ORAL at 09:30

## 2024-06-27 RX ADMIN — OXYCODONE 10 MG: 5 TABLET ORAL at 01:48

## 2024-06-27 RX ADMIN — DOCUSATE SODIUM 100 MG: 100 CAPSULE, LIQUID FILLED ORAL at 09:30

## 2024-06-27 RX ADMIN — METRONIDAZOLE 500 MG: 500 TABLET ORAL at 20:28

## 2024-06-27 RX ADMIN — SODIUM CHLORIDE, PRESERVATIVE FREE 10 ML: 5 INJECTION INTRAVENOUS at 15:25

## 2024-06-27 RX ADMIN — CEPHALEXIN 500 MG: 500 CAPSULE ORAL at 13:47

## 2024-06-27 RX ADMIN — KETOROLAC TROMETHAMINE 30 MG: 30 INJECTION, SOLUTION INTRAMUSCULAR at 09:31

## 2024-06-27 RX ADMIN — PANTOPRAZOLE SODIUM 40 MG: 40 TABLET, DELAYED RELEASE ORAL at 06:08

## 2024-06-27 RX ADMIN — SODIUM CHLORIDE, PRESERVATIVE FREE 10 ML: 5 INJECTION INTRAVENOUS at 20:30

## 2024-06-27 RX ADMIN — ASPIRIN 81 MG: 81 TABLET, COATED ORAL at 09:30

## 2024-06-27 RX ADMIN — SIMETHICONE 80 MG: 80 TABLET, CHEWABLE ORAL at 15:30

## 2024-06-27 RX ADMIN — ACETAMINOPHEN 1000 MG: 500 TABLET ORAL at 11:10

## 2024-06-27 RX ADMIN — METRONIDAZOLE 500 MG: 500 TABLET ORAL at 06:08

## 2024-06-27 RX ADMIN — OXYCODONE 5 MG: 5 TABLET ORAL at 11:10

## 2024-06-27 RX ADMIN — DOCUSATE SODIUM 100 MG: 100 CAPSULE, LIQUID FILLED ORAL at 20:29

## 2024-06-27 RX ADMIN — OXYCODONE 5 MG: 5 TABLET ORAL at 20:28

## 2024-06-27 RX ADMIN — ACETAMINOPHEN 650 MG: 325 TABLET ORAL at 00:15

## 2024-06-27 RX ADMIN — SODIUM CHLORIDE, PRESERVATIVE FREE 10 ML: 5 INJECTION INTRAVENOUS at 09:32

## 2024-06-27 RX ADMIN — OXYCODONE 5 MG: 5 TABLET ORAL at 06:08

## 2024-06-27 RX ADMIN — KETOROLAC TROMETHAMINE 30 MG: 30 INJECTION, SOLUTION INTRAMUSCULAR at 15:24

## 2024-06-27 RX ADMIN — SIMETHICONE 80 MG: 80 TABLET, CHEWABLE ORAL at 20:28

## 2024-06-27 ASSESSMENT — PAIN SCALES - GENERAL
PAINLEVEL_OUTOF10: 7
PAINLEVEL_OUTOF10: 4
PAINLEVEL_OUTOF10: 2
PAINLEVEL_OUTOF10: 4

## 2024-06-27 ASSESSMENT — PAIN DESCRIPTION - ORIENTATION
ORIENTATION: LOWER

## 2024-06-27 ASSESSMENT — PAIN DESCRIPTION - DESCRIPTORS
DESCRIPTORS: ACHING;DISCOMFORT;SORE
DESCRIPTORS: ACHING
DESCRIPTORS: ACHING
DESCRIPTORS: ACHING;DISCOMFORT;SORE
DESCRIPTORS: ACHING

## 2024-06-27 ASSESSMENT — PAIN - FUNCTIONAL ASSESSMENT
PAIN_FUNCTIONAL_ASSESSMENT: ACTIVITIES ARE NOT PREVENTED

## 2024-06-27 ASSESSMENT — PAIN DESCRIPTION - LOCATION
LOCATION: ABDOMEN;INCISION

## 2024-06-27 NOTE — PLAN OF CARE
Problem: Postpartum  Goal: Experiences normal postpartum course  Description:  Postpartum OB-Pregnancy care plan goal which identifies if the mother is experiencing a normal postpartum course  Outcome: Progressing  Goal: Appropriate maternal -  bonding  Description:  Postpartum OB-Pregnancy care plan goal which identifies if the mother and  are bonding appropriately  Outcome: Progressing  Goal: Establishment of infant feeding pattern  Description:  Postpartum OB-Pregnancy care plan goal which identifies if the mother is establishing a feeding pattern with their   Outcome: Progressing     Problem: Pain  Goal: Verbalizes/displays adequate comfort level or baseline comfort level  Outcome: Progressing

## 2024-06-27 NOTE — ANESTHESIA POSTPROCEDURE EVALUATION
Department of Anesthesiology  Postprocedure Note    Patient: Davina Kitchen  MRN: [id number]  YOB: 1990  Date of evaluation: 2024    Procedure Summary       Date: 24 Room / Location: [address]    Anesthesia Start:  Anesthesia Stop:     Procedures:        SECTION (Uterus)      Labor Analgesia Diagnosis:       Failure to progress in labor      (Failure to progress in labor [O62.2])    Surgeons: Zev Cadena MD Responsible Provider: Sharee Wharton DO    Anesthesia Type: general, spinal, epidural ASA Status: 3            Anesthesia Type: No value filed.    Eduardo Phase I: Eduardo Score: 9    Eduardo Phase II:      Anesthesia Post Evaluation    Patient location during evaluation: bedside  Patient participation: complete - patient participated  Level of consciousness: awake and alert  Pain score: 2  Airway patency: patent  Nausea & Vomiting: no nausea and no vomiting  Cardiovascular status: blood pressure returned to baseline and hemodynamically stable  Respiratory status: acceptable, spontaneous ventilation and room air  Hydration status: stable  Pain management: adequate and satisfactory to patient        No notable events documented.

## 2024-06-27 NOTE — PROCEDURES
PROCEDURE NOTE  Date: 2024   Name: Davina Kitchen  YOB: 1990    Procedures    PREOPERATIVE DIAGNOSES:     1.  37 week intrauterine pregnancy.  2.  Failure to progress, oligohydramnios, IVF      POSTOPERATIVE DIAGNOSES:     Same.      PROCEDURE: Primary low transverse  section.      SURGEON:  Zev Cadena MD  Assistant Davina Peace, APRN-CNM  ESTIMATED BLOOD LOSS: Pending QBL mL.      COMPLICATIONS:  None.         ANESTHESIA: Epidural.         FINDINGS:  [unfilled]  infant Apgars APGAR One: N/A  and APGAR Five: N/A , weight: [unfilled] lbs  [unfilled] oz  .  Normal  tubes and ovaries bilaterally.         DETAILS OF PROCEDURE: After consent was confirmed the patient she was taken the operating room and prepped and draped dorsal supine position under epidural anesthesia that was redosed and noted be adequate.  Patient is a primigravida at 37 weeks who was diagnosed with oligohydramnios at [facility] and sent to the hospital for induction of labor Cytotec was placed she progressed to 3 cm Pitocin was started membranes are ruptured she never passed 4 cm despite adequate contractions and epidural anesthesia and rupture membranes greater than 4 hours therefore consent was confirmed for a  section with the patient and the father of the baby.  Pfannenstiel skin incision made with a scalpel and carried to underlying layer of fascia the Bovie fascia was nicked the midline transected bilaterally with Bovie dissected superiorly and inferiorly from actually the complication.  Rectus was  midline peritoneum was elevated and entered sharply dissected bilateral manually Faustino retractor was placed uterus incised with a scalpel superior to the bladder membranes ruptured OP clear fluid following baby was delivered vertex slightly asynclitic with a nuchal cord x 1 which was reduced vigorous of birth nose and mouth with suction bulb suction cord was clamped and cut after

## 2024-06-27 NOTE — ANESTHESIA PROCEDURE NOTES
Spinal Block    Patient location during procedure: OR  End time: 6/26/2024 8:22 PM  Reason for block: primary anesthetic  Staffing  Performed: resident/CRNA   Anesthesiologist: Sharee Wharton DO  Resident/CRNA: Jerel Lopez APRN - CRNA  Performed by: Jerel Lopez APRN - CRNA  Authorized by: Sharee Wharton DO    Spinal Block  Patient position: sitting  Prep: ChloraPrep  Patient monitoring: continuous pulse ox and frequent blood pressure checks  Approach: midline  Location: L3/L4  Provider prep: mask and sterile gloves  Local infiltration: lidocaine  Needle  Needle type: Pencan   Needle gauge: 25 G  Needle length: 4 in  Assessment  Sensory level: T4  Events: cerebrospinal fluid  Swirl obtained: Yes  CSF: clear  Attempts: 1  Hemodynamics: stable  Preanesthetic Checklist  Completed: patient identified, IV checked, site marked, risks and benefits discussed, surgical/procedural consents, equipment checked, pre-op evaluation, timeout performed, anesthesia consent given, oxygen available, monitors applied/VS acknowledged, fire risk safety assessment completed and verbalized and blood product R/B/A discussed and consented

## 2024-06-27 NOTE — PLAN OF CARE
Problem: Postpartum  Goal: Experiences normal postpartum course  Description:  Postpartum OB-Pregnancy care plan goal which identifies if the mother is experiencing a normal postpartum course  2024 100 by Liudmila Leslie RN  Outcome: Progressing  2024 by Vernon Garcia RN  Outcome: Progressing  Goal: Appropriate maternal -  bonding  Description:  Postpartum OB-Pregnancy care plan goal which identifies if the mother and  are bonding appropriately  2024 100 by Liudmila Leslie RN  Outcome: Progressing  2024 by Vernon Garcia RN  Outcome: Progressing  Goal: Establishment of infant feeding pattern  Description:  Postpartum OB-Pregnancy care plan goal which identifies if the mother is establishing a feeding pattern with their   2024 100 by Liudmila Leslie RN  Outcome: Progressing  2024 by Vernon Garcia RN  Outcome: Progressing  Goal: Incisions, wounds, or drain sites healing without S/S of infection  Outcome: Progressing     Problem: Pain  Goal: Verbalizes/displays adequate comfort level or baseline comfort level  2024 by Liudmila Leslie RN  Outcome: Progressing  Flowsheets (Taken 2024 0930)  Verbalizes/displays adequate comfort level or baseline comfort level:   Encourage patient to monitor pain and request assistance   Assess pain using appropriate pain scale   Administer analgesics based on type and severity of pain and evaluate response   Implement non-pharmacological measures as appropriate and evaluate response  2024 by Vernon Garcia RN  Outcome: Progressing     Problem: Infection - Adult  Goal: Absence of infection during hospitalization  Outcome: Progressing     Problem: Safety - Adult  Goal: Free from fall injury  Outcome: Progressing

## 2024-06-27 NOTE — PROGRESS NOTES
SUBJECTIVE:   Patient without complaint    OBJECTIVE:   /73   Pulse 72   Temp 98 °F (36.7 °C) (Temporal)   Resp 16   SpO2 98%   Breastfeeding Unknown      Recent Labs     06/25/24  1100   WBC 12.3*   RBC 4.15   HGB 12.3   HCT 37.4   MCV 90.1   MCH 29.6   MCHC 32.9   RDW 12.6      MPV 11.1      Incision clean, dry, intact   Abdomen soft, nontender    ASSESSMENT/PLAN:   Postoperative Day #1   Advance care   Acute blood loss anemia - FeSO4    Zev Cadena MD 6/27/2024 6:39 AM

## 2024-06-27 NOTE — PROGRESS NOTES
[employer] [facility] screening results were discussed with parent. Questions answered. Brochure given to parent. Advised to monitor developmental milestones and contact physician for any concerns.   Pierce rEvin

## 2024-06-27 NOTE — PROGRESS NOTES
Patient oriented to room 318, call light and telephone usage shown to the patient. New admission vital signs and assessment completed as charted. New admission paperwork gone over with the patient including the TDAP, and Hepatitis B vaccines. The patient would like the TDAP vaccine. Patient educated on incentive spirometer use, IV and IV fluids, moody removal, SCD use, ambulation, safe sleep for baby poster, and visitation policy. All questions answered.

## 2024-06-27 NOTE — PROGRESS NOTES
Discharge education videos completed by patient. Pt verbalized understanding, no questions at this time.

## 2024-06-27 NOTE — PROGRESS NOTES
PreOp DX:  Term Pregnancy,failure to progress    Post OP Dx:  Term Pregnancy delivered by PrimaryCesarean                       Living female baby delivered    Procedure:  Surgery/ Assistance    Anesthesia: epidural    Under epidural anesthesia the abdomen was prepared and draped .    With my technical assistance Dr Cadena performed  a , I assisted- in the absence of a surgical resident -with retraction, exposure, delivery of infant, and suture cutting/management throughout the entire procedure.     Clear amniotic fluid.  A living male/female infant was delivered at , weighing 2863g with Apgar scores of 8 at 1 minute & 9 at 5 minutes, respectively.  loose nuchal cord. (-) meconium.  3 vessel umbilical cord. Cord gases obtained.    Then the uterus and the abdomen were closed.    Procedure was well tolerated.    GERDA Marin - SATURNINO

## 2024-06-27 NOTE — PROGRESS NOTES
Patient moody output 1000mL. Patient dangled and stood at bedside, denied dizziness. Patient ambulated to the bathroom independently, tolerated well. Moody catheter removed, patient voided small amount. Bleeding small, no clots. Stormy care demonstration given. Pads and underwear changed. Patient ambulated back to bed. Tolerated well.

## 2024-06-28 VITALS
HEART RATE: 82 BPM | OXYGEN SATURATION: 100 % | DIASTOLIC BLOOD PRESSURE: 85 MMHG | TEMPERATURE: 98 F | RESPIRATION RATE: 16 BRPM | SYSTOLIC BLOOD PRESSURE: 132 MMHG

## 2024-06-28 PROCEDURE — 90471 IMMUNIZATION ADMIN: CPT | Performed by: OBSTETRICS & GYNECOLOGY

## 2024-06-28 PROCEDURE — 6370000000 HC RX 637 (ALT 250 FOR IP): Performed by: OBSTETRICS & GYNECOLOGY

## 2024-06-28 PROCEDURE — 6360000002 HC RX W HCPCS: Performed by: OBSTETRICS & GYNECOLOGY

## 2024-06-28 PROCEDURE — 90715 TDAP VACCINE 7 YRS/> IM: CPT | Performed by: OBSTETRICS & GYNECOLOGY

## 2024-06-28 RX ORDER — IBUPROFEN 800 MG/1
800 TABLET ORAL EVERY 8 HOURS
Qty: 120 TABLET | Refills: 0 | Status: SHIPPED | OUTPATIENT
Start: 2024-06-28

## 2024-06-28 RX ORDER — OXYCODONE HYDROCHLORIDE 5 MG/1
5 TABLET ORAL EVERY 6 HOURS PRN
Qty: 12 TABLET | Refills: 0 | Status: SHIPPED | OUTPATIENT
Start: 2024-06-28 | End: 2024-07-01

## 2024-06-28 RX ADMIN — OXYCODONE 5 MG: 5 TABLET ORAL at 05:20

## 2024-06-28 RX ADMIN — PANTOPRAZOLE SODIUM 40 MG: 40 TABLET, DELAYED RELEASE ORAL at 09:10

## 2024-06-28 RX ADMIN — METRONIDAZOLE 500 MG: 500 TABLET ORAL at 14:00

## 2024-06-28 RX ADMIN — METRONIDAZOLE 500 MG: 500 TABLET ORAL at 05:20

## 2024-06-28 RX ADMIN — ASPIRIN 81 MG: 81 TABLET, COATED ORAL at 09:10

## 2024-06-28 RX ADMIN — OXYCODONE 5 MG: 5 TABLET ORAL at 14:00

## 2024-06-28 RX ADMIN — IBUPROFEN 800 MG: 800 TABLET, FILM COATED ORAL at 00:31

## 2024-06-28 RX ADMIN — ACETAMINOPHEN 1000 MG: 500 TABLET ORAL at 14:00

## 2024-06-28 RX ADMIN — CEPHALEXIN 500 MG: 500 CAPSULE ORAL at 05:20

## 2024-06-28 RX ADMIN — OXYCODONE 5 MG: 5 TABLET ORAL at 09:23

## 2024-06-28 RX ADMIN — OXYCODONE 5 MG: 5 TABLET ORAL at 00:31

## 2024-06-28 RX ADMIN — ACETAMINOPHEN 1000 MG: 500 TABLET ORAL at 05:20

## 2024-06-28 RX ADMIN — TETANUS TOXOID, REDUCED DIPHTHERIA TOXOID AND ACELLULAR PERTUSSIS VACCINE, ADSORBED 0.5 ML: 5; 2.5; 8; 8; 2.5 SUSPENSION INTRAMUSCULAR at 14:03

## 2024-06-28 RX ADMIN — DOCUSATE SODIUM 100 MG: 100 CAPSULE, LIQUID FILLED ORAL at 09:10

## 2024-06-28 RX ADMIN — CEPHALEXIN 500 MG: 500 CAPSULE ORAL at 14:00

## 2024-06-28 RX ADMIN — IBUPROFEN 800 MG: 800 TABLET, FILM COATED ORAL at 09:10

## 2024-06-28 RX ADMIN — PRENATAL WITH FERROUS FUM AND FOLIC ACID 1 TABLET: 3080; 920; 120; 400; 22; 1.84; 3; 20; 10; 1; 12; 200; 27; 25; 2 TABLET ORAL at 09:10

## 2024-06-28 RX ADMIN — BISACODYL 10 MG: 10 SUPPOSITORY RECTAL at 14:00

## 2024-06-28 ASSESSMENT — PAIN SCALES - GENERAL
PAINLEVEL_OUTOF10: 3
PAINLEVEL_OUTOF10: 4

## 2024-06-28 ASSESSMENT — PAIN DESCRIPTION - ORIENTATION
ORIENTATION: LOWER

## 2024-06-28 ASSESSMENT — PAIN DESCRIPTION - DESCRIPTORS
DESCRIPTORS: ACHING
DESCRIPTORS: ACHING
DESCRIPTORS: ACHING;DISCOMFORT;SORE
DESCRIPTORS: ACHING;DISCOMFORT;SORE

## 2024-06-28 ASSESSMENT — PAIN - FUNCTIONAL ASSESSMENT
PAIN_FUNCTIONAL_ASSESSMENT: ACTIVITIES ARE NOT PREVENTED

## 2024-06-28 ASSESSMENT — PAIN DESCRIPTION - LOCATION
LOCATION: ABDOMEN
LOCATION: ABDOMEN;INCISION

## 2024-06-28 NOTE — PROGRESS NOTES
SUBJECTIVE:   Patient without complaint    OBJECTIVE:   /61   Pulse 82   Temp 97.7 °F (36.5 °C) (Temporal)   Resp 16   SpO2 98%   Breastfeeding Unknown    Incision clean, dry, intact   Abdomen soft, nontender    ASSESSMENT/PLAN:   Postoperative Day #2   Discharge home with precaution after bowel movement per pt request   Follow-up 1 week in office   Post operative precautions given       Zev Cadena MD 6/28/2024 6:51 AM

## 2024-06-28 NOTE — PLAN OF CARE
Problem: Postpartum  Goal: Experiences normal postpartum course  Description:  Postpartum OB-Pregnancy care plan goal which identifies if the mother is experiencing a normal postpartum course  2024 by Liudmila Leslie RN  Outcome: Progressing  2024 by Vernon Garcia RN  Outcome: Progressing  Goal: Appropriate maternal -  bonding  Description:  Postpartum OB-Pregnancy care plan goal which identifies if the mother and  are bonding appropriately  2024 by Liudmila Leslie RN  Outcome: Progressing  2024 by Vernon Garcia RN  Outcome: Progressing  Goal: Establishment of infant feeding pattern  Description:  Postpartum OB-Pregnancy care plan goal which identifies if the mother is establishing a feeding pattern with their   2024 by Liudmila Leslie RN  Outcome: Progressing  2024 by Vernon Garcia RN  Outcome: Progressing  Goal: Incisions, wounds, or drain sites healing without S/S of infection  Outcome: Progressing     Problem: Pain  Goal: Verbalizes/displays adequate comfort level or baseline comfort level  2024 by Liudmila Leslie RN  Outcome: Progressing  Flowsheets (Taken 2024)  Verbalizes/displays adequate comfort level or baseline comfort level:   Encourage patient to monitor pain and request assistance   Assess pain using appropriate pain scale   Administer analgesics based on type and severity of pain and evaluate response   Implement non-pharmacological measures as appropriate and evaluate response  2024 by Vernon Garcia RN  Outcome: Progressing     Problem: Infection - Adult  Goal: Absence of infection at discharge  Outcome: Progressing  Goal: Absence of fever/infection during anticipated neutropenic period  Outcome: Progressing     Problem: Safety - Adult  Goal: Free from fall injury  2024 by Liudmila Leslie RN  Outcome: Progressing  2024 by Vernon Garcia RN  Outcome:

## 2024-06-28 NOTE — DISCHARGE INSTRUCTIONS
Follow up with your OB doctor in 1 week for a  delivery unless otherwise instructed, call the office for appointment..  For breastfeeding support, you can contact our lactation specialists at [phone] or   [phone].                                                                  DIET  Eat a well balanced diet focusing on foods high in fiber and protein  Drink plenty of fluids especially water.  To avoid constipation you may take a mild stool softener as recommended by your doctor or midwife.    ACTIVITY  Gradually increase your activity.  Resume exercise regimen only after advised by your doctor or midwife.  Avoid lifting anything heavier than your baby or a gallon of milk until OK'd by your physician or midlife.   Avoid driving until your doctor or midwife has given their approval.  Rise slowly from a lying to sitting and then a standing position.  Climb stairs one at a time.  Use caution when carrying your baby up and down the stairs.  No sexual activity for 6 weeks or until advised by your doctor - Nothing in vagina: intercourse, tampons, or douching.   Be prepared to discuss family planning at your follow-up OB visit.   You may feel tired or have a lack of energy.  You may continue your prenatal vitamin to replenish nutrients post delivery.  Nap when infant naps to catch up on sleep.  May return to work or school in 6 weeks or as directed by physician or midlife.   Take pain medication as prescribed whenever you need them  Take care of yourself by sleeping/resting as much as possible.  Let someone else care for you, your infant and housework as much as possible for a while.    EMOTIONS  You may feed lomeli, sad, teary, & overwhelmed.    If infant will not stop crying, contact another adult for help or place infant in their crib on their back and take a break.  NEVER shake your infant.    Call your doctor if you have unrelieved feelings of: inability to cope, anxiety, lack of interest in the

## 2024-06-28 NOTE — PROGRESS NOTES
CLINICAL PHARMACY NOTE: MEDS TO BEDS    Total # of Prescriptions Filled: 2   The following medications were delivered to the patient:  Oxycodone 5 mg   Ibuprofen 800 mg     Additional Documentation:

## 2024-06-28 NOTE — PROGRESS NOTES
Name: Davina Kitchen                Jain: Quaker   Referral: [facility] [place]      Assessment:  Parent(s) declined visit/blessing.        Intervention:       Outcome:       Plan:  Chaplains will remain available to offer spiritual and emotional support as needed.       Electronically signed by JUAN Flores, on 6/28/2024 at 3:58 PM.  [facility]  [facility]  [phone]

## 2024-07-08 PROBLEM — O28.8 NON-REACTIVE NST (NON-STRESS TEST): Status: ACTIVE | Noted: 2024-07-08

## 2024-07-08 PROBLEM — O41.03X0 OLIGOHYDRAMNIOS IN THIRD TRIMESTER: Status: ACTIVE | Noted: 2024-07-08

## 2024-07-09 LAB — SURGICAL PATHOLOGY REPORT: NORMAL

## (undated) DEVICE — 1LYRTR 16FR10ML 100%SILI SNAP: Brand: MEDLINE INDUSTRIES, INC.

## (undated) DEVICE — VACUETTE® TUBE 6 ML Z SERUM CLOT ACTIVATOR 13X100 RED CAP-BLACK RING, NON-RIDGED: Brand: VACUETTE

## (undated) DEVICE — 4-PORT MANIFOLD: Brand: NEPTUNE 2

## (undated) DEVICE — BAG SPECIMEN BIOHAZARD 6IN X 9IN

## (undated) DEVICE — CESAREAN BIRTH PACK: Brand: MEDLINE INDUSTRIES, INC.

## (undated) DEVICE — GLOVE SURG SZ 65 THK91MIL LTX FREE SYN POLYISOPRENE

## (undated) DEVICE — SUTURE STRATAFIX SYMMETRIC PDS + SZ 1 L18IN ABSRB VLT L48MM SXPP1A400

## (undated) DEVICE — SYRINGE MED 10ML LUERLOCK TIP W/O SFTY DISP

## (undated) DEVICE — SOLUTION IRRIG 500ML 0.9% SOD CHLO USP POUR PLAS BTL

## (undated) DEVICE — DOUBLE BASIN SET: Brand: MEDLINE INDUSTRIES, INC.

## (undated) DEVICE — 34" SINGLE PATIENT USE HOVERMATT BREATHABLE: Brand: SINGLE PATIENT USE HOVERMATT

## (undated) DEVICE — GLOVE ORANGE PI 7   MSG9070

## (undated) DEVICE — CONTAINER SPEC 64OZ POLYPR PATH SNAP LOK CAP W/ LID

## (undated) DEVICE — GLOVE SURG SZ 7 L12IN FNGR THK79MIL GRN LTX FREE

## (undated) DEVICE — NEPTUNE E-SEP SMOKE EVACUATION PENCIL, COATED, 70MM BLADE, PUSH BUTTON SWITCH: Brand: NEPTUNE E-SEP

## (undated) DEVICE — APPLICATOR MEDICATED 26 CC SOLUTION HI LT ORNG CHLORAPREP

## (undated) DEVICE — SUTURE CHROMIC GUT SZ 1 L36IN ABSRB BRN L40MM CT 1/2 CIR 915H

## (undated) DEVICE — STAPLER SKIN SQ 30 ABSRB STPL DISP INSORB ORDER VIA PHONE OR EMAIL

## (undated) DEVICE — Device: Brand: PORTEX

## (undated) DEVICE — SUTURE PLN GUT SZ 3-0 L27IN ABSRB YELLOWISH TAN L36MM CT-1 842H

## (undated) DEVICE — HYPODERMIC SAFETY NEEDLE: Brand: MAGELLAN

## (undated) DEVICE — CONTAINER,SPEC,PNEUM TUBE,3OZ,STRL PATH: Brand: MEDLINE